# Patient Record
Sex: FEMALE | Race: BLACK OR AFRICAN AMERICAN | NOT HISPANIC OR LATINO | Employment: FULL TIME | URBAN - METROPOLITAN AREA
[De-identification: names, ages, dates, MRNs, and addresses within clinical notes are randomized per-mention and may not be internally consistent; named-entity substitution may affect disease eponyms.]

---

## 2024-09-17 ENCOUNTER — HOSPITAL ENCOUNTER (OUTPATIENT)
Facility: HOSPITAL | Age: 34
Discharge: HOME | End: 2024-09-18
Attending: EMERGENCY MEDICINE | Admitting: FAMILY MEDICINE
Payer: OTHER GOVERNMENT

## 2024-09-17 DIAGNOSIS — R29.818 ACUTE FOCAL NEUROLOGICAL DEFICIT: ICD-10-CM

## 2024-09-17 DIAGNOSIS — G43.919 INTRACTABLE MIGRAINE WITHOUT STATUS MIGRAINOSUS, UNSPECIFIED MIGRAINE TYPE: ICD-10-CM

## 2024-09-17 DIAGNOSIS — R27.0 ATAXIA: Primary | ICD-10-CM

## 2024-09-17 DIAGNOSIS — R29.810 FACIAL DROOP: ICD-10-CM

## 2024-09-17 PROBLEM — F41.1 GAD (GENERALIZED ANXIETY DISORDER): Status: ACTIVE | Noted: 2024-09-17

## 2024-09-17 PROBLEM — G47.00 INSOMNIA: Status: ACTIVE | Noted: 2024-09-17

## 2024-09-17 PROBLEM — R51.9 HEADACHE: Status: ACTIVE | Noted: 2024-09-17

## 2024-09-17 PROBLEM — R53.1 LEFT-SIDED WEAKNESS: Status: ACTIVE | Noted: 2024-09-17

## 2024-09-17 LAB
ALBUMIN SERPL BCP-MCNC: 4.2 G/DL (ref 3.5–5.2)
ALP SERPL-CCNC: 55 U/L (ref 55–135)
ALT SERPL W/O P-5'-P-CCNC: 12 U/L (ref 10–44)
AMPHET+METHAMPHET UR QL: NEGATIVE
ANION GAP SERPL CALC-SCNC: 8 MMOL/L (ref 8–16)
AST SERPL-CCNC: 14 U/L (ref 10–40)
BARBITURATES UR QL SCN>200 NG/ML: NEGATIVE
BASOPHILS # BLD AUTO: 0.02 K/UL (ref 0–0.2)
BASOPHILS NFR BLD: 0.6 % (ref 0–1.9)
BENZODIAZ UR QL SCN>200 NG/ML: NEGATIVE
BILIRUB SERPL-MCNC: 0.9 MG/DL (ref 0.1–1)
BUN SERPL-MCNC: 13 MG/DL (ref 6–20)
BZE UR QL SCN: NEGATIVE
CALCIUM SERPL-MCNC: 9.2 MG/DL (ref 8.7–10.5)
CANNABINOIDS UR QL SCN: NEGATIVE
CHLORIDE SERPL-SCNC: 108 MMOL/L (ref 95–110)
CHOLEST SERPL-MCNC: 120 MG/DL (ref 120–199)
CHOLEST/HDLC SERPL: 3.2 {RATIO} (ref 2–5)
CO2 SERPL-SCNC: 23 MMOL/L (ref 23–29)
CREAT SERPL-MCNC: 0.8 MG/DL (ref 0.5–1.4)
CREAT UR-MCNC: 51.4 MG/DL (ref 15–325)
DIFFERENTIAL METHOD BLD: ABNORMAL
EOSINOPHIL # BLD AUTO: 0 K/UL (ref 0–0.5)
EOSINOPHIL NFR BLD: 0.9 % (ref 0–8)
ERYTHROCYTE [DISTWIDTH] IN BLOOD BY AUTOMATED COUNT: 12 % (ref 11.5–14.5)
EST. GFR  (NO RACE VARIABLE): >60 ML/MIN/1.73 M^2
GLUCOSE SERPL-MCNC: 104 MG/DL (ref 70–110)
HCT VFR BLD AUTO: 42.4 % (ref 37–48.5)
HDLC SERPL-MCNC: 38 MG/DL (ref 40–75)
HDLC SERPL: 31.7 % (ref 20–50)
HGB BLD-MCNC: 13.9 G/DL (ref 12–16)
IMM GRANULOCYTES # BLD AUTO: 0.01 K/UL (ref 0–0.04)
IMM GRANULOCYTES NFR BLD AUTO: 0.3 % (ref 0–0.5)
INR PPP: 1 (ref 0.8–1.2)
LDLC SERPL CALC-MCNC: 72 MG/DL (ref 63–159)
LYMPHOCYTES # BLD AUTO: 1.1 K/UL (ref 1–4.8)
LYMPHOCYTES NFR BLD: 33.4 % (ref 18–48)
MCH RBC QN AUTO: 28.4 PG (ref 27–31)
MCHC RBC AUTO-ENTMCNC: 32.8 G/DL (ref 32–36)
MCV RBC AUTO: 87 FL (ref 82–98)
METHADONE UR QL SCN>300 NG/ML: NEGATIVE
MONOCYTES # BLD AUTO: 0.3 K/UL (ref 0.3–1)
MONOCYTES NFR BLD: 8.2 % (ref 4–15)
NEUTROPHILS # BLD AUTO: 1.9 K/UL (ref 1.8–7.7)
NEUTROPHILS NFR BLD: 56.6 % (ref 38–73)
NONHDLC SERPL-MCNC: 82 MG/DL
NRBC BLD-RTO: 0 /100 WBC
OPIATES UR QL SCN: NEGATIVE
PCP UR QL SCN>25 NG/ML: NEGATIVE
PLATELET # BLD AUTO: 269 K/UL (ref 150–450)
PMV BLD AUTO: 9.4 FL (ref 9.2–12.9)
POCT GLUCOSE: 107 MG/DL (ref 70–110)
POTASSIUM SERPL-SCNC: 3.9 MMOL/L (ref 3.5–5.1)
PROT SERPL-MCNC: 7.8 G/DL (ref 6–8.4)
PROTHROMBIN TIME: 11.2 SEC (ref 9–12.5)
RBC # BLD AUTO: 4.9 M/UL (ref 4–5.4)
SODIUM SERPL-SCNC: 139 MMOL/L (ref 136–145)
TOXICOLOGY INFORMATION: NORMAL
TRIGL SERPL-MCNC: 50 MG/DL (ref 30–150)
TSH SERPL DL<=0.005 MIU/L-ACNC: 0.93 UIU/ML (ref 0.4–4)
WBC # BLD AUTO: 3.29 K/UL (ref 3.9–12.7)

## 2024-09-17 PROCEDURE — G0378 HOSPITAL OBSERVATION PER HR: HCPCS

## 2024-09-17 PROCEDURE — 25000003 PHARM REV CODE 250: Performed by: EMERGENCY MEDICINE

## 2024-09-17 PROCEDURE — 97166 OT EVAL MOD COMPLEX 45 MIN: CPT

## 2024-09-17 PROCEDURE — 94761 N-INVAS EAR/PLS OXIMETRY MLT: CPT

## 2024-09-17 PROCEDURE — G0425 INPT/ED TELECONSULT30: HCPCS | Mod: GT,,, | Performed by: STUDENT IN AN ORGANIZED HEALTH CARE EDUCATION/TRAINING PROGRAM

## 2024-09-17 PROCEDURE — 80053 COMPREHEN METABOLIC PANEL: CPT | Performed by: EMERGENCY MEDICINE

## 2024-09-17 PROCEDURE — 97530 THERAPEUTIC ACTIVITIES: CPT

## 2024-09-17 PROCEDURE — 97162 PT EVAL MOD COMPLEX 30 MIN: CPT

## 2024-09-17 PROCEDURE — 25000003 PHARM REV CODE 250: Performed by: FAMILY MEDICINE

## 2024-09-17 PROCEDURE — 80061 LIPID PANEL: CPT | Performed by: EMERGENCY MEDICINE

## 2024-09-17 PROCEDURE — 85610 PROTHROMBIN TIME: CPT | Performed by: EMERGENCY MEDICINE

## 2024-09-17 PROCEDURE — 80307 DRUG TEST PRSMV CHEM ANLYZR: CPT | Performed by: FAMILY MEDICINE

## 2024-09-17 PROCEDURE — 93005 ELECTROCARDIOGRAM TRACING: CPT

## 2024-09-17 PROCEDURE — 82962 GLUCOSE BLOOD TEST: CPT

## 2024-09-17 PROCEDURE — 25500020 PHARM REV CODE 255: Performed by: EMERGENCY MEDICINE

## 2024-09-17 PROCEDURE — 63600175 PHARM REV CODE 636 W HCPCS: Performed by: FAMILY MEDICINE

## 2024-09-17 PROCEDURE — 93010 ELECTROCARDIOGRAM REPORT: CPT | Mod: ,,, | Performed by: INTERNAL MEDICINE

## 2024-09-17 PROCEDURE — 96365 THER/PROPH/DIAG IV INF INIT: CPT

## 2024-09-17 PROCEDURE — 99285 EMERGENCY DEPT VISIT HI MDM: CPT | Mod: 25

## 2024-09-17 PROCEDURE — 84443 ASSAY THYROID STIM HORMONE: CPT | Performed by: EMERGENCY MEDICINE

## 2024-09-17 PROCEDURE — 96372 THER/PROPH/DIAG INJ SC/IM: CPT | Performed by: FAMILY MEDICINE

## 2024-09-17 PROCEDURE — 85025 COMPLETE CBC W/AUTO DIFF WBC: CPT | Performed by: EMERGENCY MEDICINE

## 2024-09-17 PROCEDURE — 96374 THER/PROPH/DIAG INJ IV PUSH: CPT | Mod: 59

## 2024-09-17 PROCEDURE — 97116 GAIT TRAINING THERAPY: CPT

## 2024-09-17 RX ORDER — TRAZODONE HYDROCHLORIDE 50 MG/1
50 TABLET ORAL NIGHTLY
COMMUNITY
Start: 2023-12-14

## 2024-09-17 RX ORDER — ESCITALOPRAM OXALATE 20 MG/1
20 TABLET ORAL
Status: ON HOLD | COMMUNITY
End: 2024-09-18

## 2024-09-17 RX ORDER — ONDANSETRON 4 MG/1
4 TABLET, ORALLY DISINTEGRATING ORAL EVERY 6 HOURS PRN
Status: DISCONTINUED | OUTPATIENT
Start: 2024-09-17 | End: 2024-09-18 | Stop reason: HOSPADM

## 2024-09-17 RX ORDER — KETOROLAC TROMETHAMINE 30 MG/ML
30 INJECTION, SOLUTION INTRAMUSCULAR; INTRAVENOUS EVERY 6 HOURS PRN
Status: DISCONTINUED | OUTPATIENT
Start: 2024-09-17 | End: 2024-09-18 | Stop reason: HOSPADM

## 2024-09-17 RX ORDER — ESCITALOPRAM OXALATE 10 MG/1
20 TABLET ORAL DAILY
Status: DISCONTINUED | OUTPATIENT
Start: 2024-09-17 | End: 2024-09-17

## 2024-09-17 RX ORDER — HYDROXYZINE HYDROCHLORIDE 25 MG/1
50 TABLET, FILM COATED ORAL
COMMUNITY
Start: 2023-12-14

## 2024-09-17 RX ORDER — LABETALOL HYDROCHLORIDE 5 MG/ML
10 INJECTION, SOLUTION INTRAVENOUS EVERY 4 HOURS PRN
Status: DISCONTINUED | OUTPATIENT
Start: 2024-09-17 | End: 2024-09-18 | Stop reason: HOSPADM

## 2024-09-17 RX ORDER — ENOXAPARIN SODIUM 100 MG/ML
40 INJECTION SUBCUTANEOUS EVERY 24 HOURS
Status: DISCONTINUED | OUTPATIENT
Start: 2024-09-17 | End: 2024-09-18 | Stop reason: HOSPADM

## 2024-09-17 RX ORDER — MECLIZINE HYDROCHLORIDE 25 MG/1
25 TABLET ORAL
Status: COMPLETED | OUTPATIENT
Start: 2024-09-17 | End: 2024-09-17

## 2024-09-17 RX ORDER — GABAPENTIN 100 MG/1
1 CAPSULE ORAL EVERY MORNING
COMMUNITY

## 2024-09-17 RX ORDER — BENZOYL PEROXIDE 100 MG/ML
LIQUID TOPICAL
COMMUNITY
Start: 2024-06-25

## 2024-09-17 RX ORDER — ACETAMINOPHEN 325 MG/1
650 TABLET ORAL EVERY 6 HOURS PRN
Status: DISCONTINUED | OUTPATIENT
Start: 2024-09-17 | End: 2024-09-18 | Stop reason: HOSPADM

## 2024-09-17 RX ORDER — MEDROXYPROGESTERONE ACETATE 10 MG/1
10 TABLET ORAL DAILY
Status: ON HOLD | COMMUNITY
Start: 2024-09-16 | End: 2024-09-18 | Stop reason: HOSPADM

## 2024-09-17 RX ORDER — CLINDAMYCIN PHOSPHATE 10 MG/G
GEL TOPICAL
COMMUNITY
Start: 2024-06-25

## 2024-09-17 RX ORDER — IBUPROFEN 800 MG/1
800 TABLET ORAL
COMMUNITY
Start: 2024-07-12

## 2024-09-17 RX ORDER — SODIUM CHLORIDE 0.9 % (FLUSH) 0.9 %
10 SYRINGE (ML) INJECTION
Status: DISCONTINUED | OUTPATIENT
Start: 2024-09-17 | End: 2024-09-18 | Stop reason: HOSPADM

## 2024-09-17 RX ORDER — KETOROLAC TROMETHAMINE 30 MG/ML
30 INJECTION, SOLUTION INTRAMUSCULAR; INTRAVENOUS ONCE
Status: COMPLETED | OUTPATIENT
Start: 2024-09-17 | End: 2024-09-17

## 2024-09-17 RX ORDER — METRONIDAZOLE 500 MG/1
1 TABLET ORAL 2 TIMES DAILY
Status: ON HOLD | COMMUNITY
Start: 2024-05-16 | End: 2024-09-18 | Stop reason: CLARIF

## 2024-09-17 RX ORDER — TRAZODONE HYDROCHLORIDE 50 MG/1
50 TABLET ORAL NIGHTLY
Status: DISCONTINUED | OUTPATIENT
Start: 2024-09-17 | End: 2024-09-18 | Stop reason: HOSPADM

## 2024-09-17 RX ADMIN — IOHEXOL 100 ML: 350 INJECTION, SOLUTION INTRAVENOUS at 09:09

## 2024-09-17 RX ADMIN — ENOXAPARIN SODIUM 40 MG: 40 INJECTION SUBCUTANEOUS at 05:09

## 2024-09-17 RX ADMIN — MECLIZINE HYDROCHLORIDE 25 MG: 25 TABLET ORAL at 10:09

## 2024-09-17 RX ADMIN — KETOROLAC TROMETHAMINE 30 MG: 30 INJECTION, SOLUTION INTRAMUSCULAR; INTRAVENOUS at 01:09

## 2024-09-17 RX ADMIN — TRAZODONE HYDROCHLORIDE 50 MG: 50 TABLET ORAL at 08:09

## 2024-09-17 RX ADMIN — ACETAMINOPHEN 650 MG: 325 TABLET ORAL at 06:09

## 2024-09-17 NOTE — ASSESSMENT & PLAN NOTE
CT head and CTA negative   Symptoms improving  Symptoms possibly related to complicated migraine  As patient had headache during time of symptoms  Will obtain MRI   Neuro checks  Pt/ot

## 2024-09-17 NOTE — PT/OT/SLP EVAL
"Occupational Therapy   Evaluation    Name: Lizzy Anders  MRN: [id number]  Admitting Diagnosis: Left-sided weakness  Recent Surgery: * No surgery found *      Recommendations:     Discharge Recommendations: Low Intensity Therapy  Discharge Equipment Recommendations:  none  Barriers to discharge:  None    Assessment:     Lizzy Anders is a 33 y.o. female with a medical diagnosis of Left-sided weakness.  She presents with the following performance deficits affecting function: weakness, impaired endurance, impaired self care skills, impaired functional mobility, gait instability, impaired balance, decreased coordination, decreased upper extremity function, decreased lower extremity function, pain, abnormal tone, impaired fine motor.      Rehab Prognosis: Good; patient would benefit from acute skilled OT services to address these deficits and reach maximum level of function.       Plan:     Patient to be seen 2 x/week to address the above listed problems via self-care/home management, therapeutic activities, therapeutic exercises, community/work re-entry  Plan of Care Expires: 10/01/24  Plan of Care Reviewed with: patient, spouse    Subjective     Chief Complaint: "How is this going to affect my daily tasks?"  Patient/Family Comments/goals: To return to previous level of function.     Occupational Profile:  Living Environment: Lives with spouse and children in single story home with no steps to enter. Tub shower configuration.   Previous level of function: Independent with ADLs, IADLs.  Roles and Routines: Drives, works for , mother of two (8 & 10 y/o)  Equipment Used at Home: none  Assistance upon Discharge: family    Pain/Comfort:  Pain Rating 1: 6/10  Location - Side 1: Right  Location 1: head  Pain Addressed 1: Nurse notified    Patients cultural, spiritual, Hinduism conflicts given the current situation: no    Objective:     Communicated with: nursing prior to session.  Patient found HOB " "elevated with peripheral IV upon OT entry to room.    General Precautions: Standard, fall  Orthopedic Precautions: N/A  Braces: N/A  Respiratory Status: Room air    Occupational Performance:    Bed Mobility:    Patient completed Scooting/Bridging with stand by assistance  Patient completed Supine to Sit with stand by assistance  Patient completed Sit to Supine with stand by assistance    Functional Mobility/Transfers:  Patient completed Sit <> Stand Transfer with contact guard assistance  with  no assistive device   Patient completed Bed <> Chair Transfer using Step Transfer technique with minimum assistance with hand-held assist  Functional Mobility: Patient ambulated with Min A handheld assistance in room and hallway. Gait instability, L LE weakness, and motor planning deficits limiting independence with functional turns and steps.      Activities of Daily Living:  Lower Body Dressing: contact guard assistance to don shoes while seated EOB    Cognitive/Visual Perceptual:  Cognitive/Psychosocial Skills:     -       Oriented to: Person, Place, Time, and Situation   -       Follows Commands/attention:Follows multistep  commands  -       Communication: clear/fluent  -       Safety awareness/insight to disability: intact   -       Mood/Affect/Coping skills/emotional control: Appropriate to situation, Cooperative, and Pleasant  Visual/Perceptual:      -Intact  tracking and saccades      Physical Exam:  Balance:    -       Sitting: WFL; Standing: Fair  Postural examination/scapula alignment:    -       No postural abnormalities identified  Sensation:    -       Impaired  numbness reported in lip on left side, and feeling of "weakness" generalized on left side  Motor Planning:    -       Delayed on left extremities  Upper Extremity Range of Motion:     -       Right Upper Extremity: WNL  -       Left Upper Extremity: WNL  Upper Extremity Strength:    -       Right Upper Extremity: WNL  -       Left Upper Extremity: " "4/5   Strength:    -       Right Upper Extremity: WNL  -       Left Upper Extremity: Fair  Fine Motor Coordination:    -       Impaired  Left hand, finger to nose delayed, Left hand thumb/finger opposition skills delayed, and Left hand, diadochokinesis skill delayed  Gross motor coordination:   Left sided hand-eye coordination    AMPAC 6 Click ADL:  AMPAC Total Score: 18    Treatment & Education:  Educated on role of OT, POC, motor learning exercises, and activity modifications. Pt discouraged from driving and return to work (due to heavy lifting and activity requirements) until approved by a doctor. Family educated on importance of "retraining" movements and utilizing L UE as normally as possibly. Answered all family's questions within scope of practice.     Patient left sitting edge of bed with all lines intact, call button in reach, and nursing and spouse present    GOALS:   Multidisciplinary Problems       Occupational Therapy Goals          Problem: Occupational Therapy    Goal Priority Disciplines Outcome Interventions   Occupational Therapy Goal     OT, PT/OT     Description: Goals to be met by: 10/1/24     Patient will increase functional independence with ADLs by performing:    LE Dressing with [place].  Grooming while standing at sink with [place].  Toilet transfer to toilet with [place].  Upper extremity exercise program x15 reps per handout, with independence.                         History:     History reviewed. No pertinent past medical history.    History reviewed. No pertinent surgical history.    Time Tracking:     OT Date of Treatment: 09/17/24  OT Start Time: 1255  OT Stop Time: 1322  OT Total Time (min): 27 min    Billable Minutes:Evaluation 15  Therapeutic Activity 12    9/17/2024  "

## 2024-09-17 NOTE — SUBJECTIVE & OBJECTIVE
HPI:  33 y.o. female w/ PMHx of migraines, Anxiety, who presents w/ HA, L facial pain, OS pain and L sided tingling.    called EMS as she was trebling, throwing up and her face was hurting .   States that her face was hurting and she noted the weakness when she woke up and then she developed L sided numbness.   States that the HA is her typical migrainous HA, although the sensory deficits are new.   Images personally reviewed and interpreted:  Study: Head CT  Study Interpretation: No acute intracranial abnormalities, specifically no early signs of ischemia and no intracranial hemorrhage.        Assessment and plan:  NIHSS 2 for RFD( present on activation, not at rest, able to activate face symmetrically when asked to puff out cheeks and subjective left sided numbness.   OOW for TNK due to most of the symptoms being present when she woke.   Low suspicion for LVO at this time based on reported deficits.     Lytics recommendation: Thrombolytic therapy not recommended due to Outside of treatment window  and Mild Non-Disabling Symptoms  Thrombectomy recommendation: No; at this time symptoms not suggestive of large vessel occlusion  Placement recommendation:  As per ED

## 2024-09-17 NOTE — H&P
[facility], later [facility] - Emergency Dept.  [facility] Medicine  History & Physical    Patient Name: Lizzy Anders  MRN: [id number]  Patient Class: OP- Observation  Admission Date: 9/17/2024  Attending Physician: Tejinder Henson MD  Primary Care Provider: No primary care provider on file.         Patient information was obtained from patient and ER records.     Subjective:     Principal Problem:Left-sided weakness    Chief Complaint:   Chief Complaint   Patient presents with    Facial Droop     With unsteady gait per EMS; patient's  noticed left sided facial droop at about 7 am this morning; EMS reports patient was able to walk to stretcher, but gait was unsteady        HPI: Patient is a 33 y.o. aa female with a PMH of FERNANDO who presents to the Emergency Department for a potential stroke evaluation. Patient reports left sided weakness/numbness, speech difficulty, and facial droop. She also reported headache and left eye pain. States having unsteady gate. Denies ever having similar symptoms on the past. Denies any chest pain, sob however does report nausea.     In the ED, code stroke called and tpa was not recommended. Head CT and CTA negative.          History reviewed. No pertinent past medical history.    History reviewed. No pertinent surgical history.    Review of patient's allergies indicates:   Allergen Reactions    Sumatriptan Hives       No current facility-administered medications on file prior to encounter.     Current Outpatient Medications on File Prior to Encounter   Medication Sig    benzoyl peroxide (BENZAC AC) 10 % external wash Apply topically.    clindamycin phosphate 1% (CLINDAGEL) 1 % gel Apply three times daily for seven to ten days for folliculitis.    EScitalopram oxalate (LEXAPRO) 20 MG tablet Take 20 mg by mouth.    gabapentin (NEURONTIN) 100 MG capsule Take 1 capsule by mouth every morning.    hydrOXYzine HCL (ATARAX) 25 MG tablet Take 50 mg by mouth.    ibuprofen (ADVIL,MOTRIN) 800 MG tablet Take 800 mg  by mouth.    medroxyPROGESTERone (PROVERA) 10 MG tablet Take 10 mg by mouth once daily.    metroNIDAZOLE (FLAGYL) 500 MG tablet Take 1 tablet by mouth 2 (two) times daily.    traZODone (DESYREL) 50 MG tablet Take 50 mg by mouth every evening.    levonorgestreL (MIRENA) 52 mg IUD 52 mg by Intrauterine route.     Family History    None       Tobacco Use    Smoking status: Unknown    Smokeless tobacco: Not on file   Substance and Sexual Activity    Alcohol use: Not on file    Drug use: Not on file    Sexual activity: Not on file     Review of Systems   Constitutional:  Negative for fatigue and fever.   HENT:  Negative for sinus pressure.    Eyes:  Negative for visual disturbance.   Respiratory:  Negative for shortness of breath.    Cardiovascular:  Negative for chest pain.   Gastrointestinal:  Negative for nausea and vomiting.   Genitourinary:  Negative for difficulty urinating.   Musculoskeletal:  Negative for back pain.   Skin:  Negative for rash.   Neurological:  Positive for facial asymmetry, speech difficulty, weakness and numbness. Negative for headaches.   Psychiatric/Behavioral:  Negative for confusion.      Objective:     Vital Signs (Most Recent):  Temp: 99.5 °F (37.5 °C) (09/17/24 0806)  Pulse: (!) 58 (09/17/24 1036)  Resp: 16 (09/17/24 1036)  BP: 112/60 (09/17/24 1036)  SpO2: 98 % (09/17/24 1036) Vital Signs (24h Range):  Temp:  [99.5 °F (37.5 °C)] 99.5 °F (37.5 °C)  Pulse:  [58-85] 58  Resp:  [16-20] 16  SpO2:  [98 %-99 %] 98 %  BP: (112-122)/(60-74) 112/60     Weight: 82.9 kg (182 lb 12.2 oz)  Body mass index is 30.41 kg/m².     Physical Exam  Constitutional:       General: She is not in acute distress.     Appearance: She is well-developed. She is not diaphoretic.   HENT:      Head: Normocephalic and atraumatic.   Eyes:      Pupils: Pupils are equal, round, and reactive to light.   Cardiovascular:      Rate and Rhythm: Normal rate and regular rhythm.      Heart sounds: Normal heart sounds. No murmur  heard.     No friction rub. No gallop.   Pulmonary:      Effort: Pulmonary effort is normal. No respiratory distress.      Breath sounds: Normal breath sounds. No stridor. No wheezing or rales.   Abdominal:      General: Bowel sounds are normal. There is no distension.      Palpations: Abdomen is soft. There is no mass.      Tenderness: There is no abdominal tenderness. There is no guarding.   Musculoskeletal:      Right lower leg: No edema.      Left lower leg: No edema.   Skin:     General: Skin is warm.      Findings: No erythema.   Neurological:      Mental Status: She is alert and oriented to person, place, and time.      Motor: Weakness (LLE weakness) present.              CRANIAL NERVES     CN III, IV, VI   Pupils are equal, round, and reactive to light.       Significant Labs:   Results for orders placed or performed during the hospital encounter of 09/17/24   CBC W/ AUTO DIFFERENTIAL   Result Value Ref Range    WBC 3.29 (L) 3.90 - 12.70 K/uL    RBC 4.90 4.00 - 5.40 M/uL    Hemoglobin 13.9 12.0 - 16.0 g/dL    Hematocrit 42.4 37.0 - 48.5 %    MCV 87 82 - 98 fL    MCH 28.4 27.0 - 31.0 pg    MCHC 32.8 32.0 - 36.0 g/dL    RDW 12.0 11.5 - 14.5 %    Platelets 269 150 - 450 K/uL    MPV 9.4 9.2 - 12.9 fL    Immature Granulocytes 0.3 0.0 - 0.5 %    Gran # (ANC) 1.9 1.8 - 7.7 K/uL    Immature Grans (Abs) 0.01 0.00 - 0.04 K/uL    Lymph # 1.1 1.0 - 4.8 K/uL    Mono # 0.3 0.3 - 1.0 K/uL    Eos # 0.0 0.0 - 0.5 K/uL    Baso # 0.02 0.00 - 0.20 K/uL    nRBC 0 0 /100 WBC    Gran % 56.6 38.0 - 73.0 %    Lymph % 33.4 18.0 - 48.0 %    Mono % 8.2 4.0 - 15.0 %    Eosinophil % 0.9 0.0 - 8.0 %    Basophil % 0.6 0.0 - 1.9 %    Differential Method Automated    Comprehensive metabolic panel   Result Value Ref Range    Sodium 139 136 - 145 mmol/L    Potassium 3.9 3.5 - 5.1 mmol/L    Chloride 108 95 - 110 mmol/L    CO2 23 23 - 29 mmol/L    Glucose 104 70 - 110 mg/dL    BUN 13 6 - 20 mg/dL    Creatinine 0.8 0.5 - 1.4 mg/dL    Calcium 9.2  8.7 - 10.5 mg/dL    Total Protein 7.8 6.0 - 8.4 g/dL    Albumin 4.2 3.5 - 5.2 g/dL    Total Bilirubin 0.9 0.1 - 1.0 mg/dL    Alkaline Phosphatase 55 55 - 135 U/L    AST 14 10 - 40 U/L    ALT 12 10 - 44 U/L    eGFR >60 >60 mL/min/1.73 m^2    Anion Gap 8 8 - 16 mmol/L   Protime-INR   Result Value Ref Range    Prothrombin Time 11.2 9.0 - 12.5 sec    INR 1.0 0.8 - 1.2   TSH   Result Value Ref Range    TSH 0.931 0.400 - 4.000 uIU/mL   Drug screen panel, in-house   Result Value Ref Range    Benzodiazepines Negative Negative    Methadone metabolites Negative Negative    Cocaine (Metab.) Negative Negative    Opiate Scrn, Ur Negative Negative    Barbiturate Screen, Ur Negative Negative    Amphetamine Screen, Ur Negative Negative    THC Negative Negative    Phencyclidine Negative Negative    Creatinine, Urine 51.4 15.0 - 325.0 mg/dL    Toxicology Information SEE COMMENT    ECG 12 lead   Result Value Ref Range    QRS Duration 78 ms    OHS QTC Calculation 460 ms   POCT glucose   Result Value Ref Range    POCT Glucose 107 70 - 110 mg/dL         Significant Imaging: I have reviewed all pertinent imaging results/findings within the past 24 hours.  Assessment/Plan:     * Left-sided weakness  CT head and CTA negative   Symptoms improving  Symptoms possibly related to complicated migraine  As patient had headache during time of symptoms  Will obtain MRI   Neuro checks  Pt/ot       Headache  Toradol given  Cont to monitor response      Insomnia  Cont trazodone       FERNANDO (generalized anxiety disorder)  Cont lexapro         VTE Risk Mitigation (From admission, onward)           Ordered     enoxaparin injection 40 mg  Daily         09/17/24 1147     Reason for no Mechanical VTE Prophylaxis  Once        Comments: lovenox   Question:  Reasons:  Answer:  Physician Provided (leave comment)    09/17/24 1147     IP VTE HIGH RISK PATIENT  Once         09/17/24 1147                       On 09/17/2024, patient should be placed in hospital  observation services under my care.             Tejinder Henson MD  Department of Hospital Medicine  [facility] - Emergency Dept.

## 2024-09-17 NOTE — PT/OT/SLP EVAL
"Physical Therapy Evaluation    Patient Name:  Lizzy Anders   MRN:  [id number]    Recommendations:     Discharge Recommendations: Low Intensity Therapy   Discharge Equipment Recommendations: walker, rolling, shower chair   Barriers to discharge: None    Assessment:     Lizzy Anders is a 33 y.o. female admitted with a medical diagnosis of Left-sided weakness.  She presents with the following impairments/functional limitations: weakness, impaired endurance, impaired functional mobility, gait instability, impaired balance, decreased safety awareness, pain, decreased lower extremity function which limits mobility and increases risk of falls. Increased difficulty walking with reports of "heaviness" to LLE particularly her hip. Increased muscle tightness and fatigue with increased distances. Pt reporting 4-5 migraines per week as well as increased dizziness. Pt will benefit from continued PT services at low intensity in order to progress toward baseline. Recommend low intensity with focus on community mobility.    Rehab Prognosis: Good; patient would benefit from acute skilled PT services to address these deficits and reach maximum level of function.    Recent Surgery: * No surgery found *      Plan:     During this hospitalization, patient to be seen 3 x/week to address the identified rehab impairments via gait training, therapeutic activities, therapeutic exercises, neuromuscular re-education and progress toward the following goals:    Plan of Care Expires:  10/01/24    Subjective     Chief Complaint: HA and L eye pain  Patient/Family Comments/goals: to get better/go home   Pain/Comfort:  Pain Rating 1: 6/10 (HA and L eye pain)  Pain Addressed 1: Reposition, Distraction, Nurse notified  Pain Rating Post-Intervention 1: 6/10    Patients cultural, spiritual, Shinto conflicts given the current situation: no    Living Environment:  Pt lives with spouse and kids (10-9 yo) in [facility] with no steps at " entry  Prior to admission, patients level of function was independent with ADLs, ambulatory in home and community. Pt works and drives.  Equipment used at home: none.  DME owned (not currently used): none.  Upon discharge, patient will have assistance from spouse.    Objective:     Communicated with nursing (Dory) and performed chart review via epic system prior to session.  Patient found supine with peripheral IV, telemetry  upon PT entry to room. Spouse at bedside    General Precautions: Standard, fall  Orthopedic Precautions:N/A   Braces: N/A  Respiratory Status: Room air    Exams:  Cognitive Exam:  Patient is oriented to Person, Place, Time, and Situation  Gross Motor Coordination:  impaired  Postural Exam:  Patient presented with the following abnormalities:    -       Rounded shoulders  -       Forward head  Sensation:    -       Impaired  impaired to LLE and around left side of mouth  RLE ROM: WFL  RLE Strength: WFL  LLE ROM: WFL except at L ankle and hip ~25-50% of normal. Movement at all joints very effortful, increased time for muscle recruitment  LLE Strength: 4/5    Functional Mobility:  Bed Mobility:     Scooting: supervision  Supine to Sit: supervision  Sit to Supine: supervision  Transfers:     Sit to Stand:  stand by assistance with no AD  Bed to Chair: stand by assistance with  no AD  using  Stand Pivot  Gait: 50 ft x 2 CGA/min A with HHA, demonstrates slow pace, flexed posture, decreased stance time and step length to LLE. Increased effort for L swing phase of gait due to tightness and heaviness at L hip with increased gait activity  Balance: fair dynamic standing balance      AM-PAC 6 CLICK MOBILITY  Total Score:16       Treatment & Education:  Educated pt on benefits of consistent participation in PT services to meet functional goals. Educated pt on seated therex to promote strength, circulation and joint mobility. Exercises included AP, LAQ, marching. Educated to perform exercises  intermittently throughout day to tolerance. Educated pt on importance of sitting OOB to promote endurance and overall activity tolerance. Educated pt on call don't fall policy and use of call button to alert nursing staff of needs (including to assist with returning back to bed). Pt expressed understanding.     Patient left sitting edge of bed with all lines intact, call button in reach, nursing notified, and spouse present.    GOALS:   Multidisciplinary Problems       Physical Therapy Goals          Problem: Physical Therapy    Goal Priority Disciplines Outcome Goal Variances Interventions   Physical Therapy Goal     PT, PT/OT      Description: Pt will perform bed mobility independently in order to participate in EOB activity.  Pt will perform transfers independently in order to participate in OOB activity.  Pt will ambulate 250 ft mod I with LRAD in order to participate in daily tasks.   Pt will tolerate sitting OOB x 2-4 hrs in order to participate in daily tasks.                        History:     History reviewed. No pertinent past medical history.    History reviewed. No pertinent surgical history.    Time Tracking:     PT Received On: 09/17/24  PT Start Time: 1255     PT Stop Time: 1320  PT Total Time (min): 25 min     Billable Minutes: Evaluation 10 and Gait Training 15      09/17/2024

## 2024-09-17 NOTE — SUBJECTIVE & OBJECTIVE
History reviewed. No pertinent past medical history.    History reviewed. No pertinent surgical history.    Review of patient's allergies indicates:   Allergen Reactions    Sumatriptan Hives       No current facility-administered medications on file prior to encounter.     Current Outpatient Medications on File Prior to Encounter   Medication Sig    benzoyl peroxide (BENZAC AC) 10 % external wash Apply topically.    clindamycin phosphate 1% (CLINDAGEL) 1 % gel Apply three times daily for seven to ten days for folliculitis.    EScitalopram oxalate (LEXAPRO) 20 MG tablet Take 20 mg by mouth.    gabapentin (NEURONTIN) 100 MG capsule Take 1 capsule by mouth every morning.    hydrOXYzine HCL (ATARAX) 25 MG tablet Take 50 mg by mouth.    ibuprofen (ADVIL,MOTRIN) 800 MG tablet Take 800 mg by mouth.    medroxyPROGESTERone (PROVERA) 10 MG tablet Take 10 mg by mouth once daily.    metroNIDAZOLE (FLAGYL) 500 MG tablet Take 1 tablet by mouth 2 (two) times daily.    traZODone (DESYREL) 50 MG tablet Take 50 mg by mouth every evening.    levonorgestreL (MIRENA) 52 mg IUD 52 mg by Intrauterine route.     Family History    None       Tobacco Use    Smoking status: Unknown    Smokeless tobacco: Not on file   Substance and Sexual Activity    Alcohol use: Not on file    Drug use: Not on file    Sexual activity: Not on file     Review of Systems   Constitutional:  Negative for fatigue and fever.   HENT:  Negative for sinus pressure.    Eyes:  Negative for visual disturbance.   Respiratory:  Negative for shortness of breath.    Cardiovascular:  Negative for chest pain.   Gastrointestinal:  Negative for nausea and vomiting.   Genitourinary:  Negative for difficulty urinating.   Musculoskeletal:  Negative for back pain.   Skin:  Negative for rash.   Neurological:  Positive for facial asymmetry, speech difficulty, weakness and numbness. Negative for headaches.   Psychiatric/Behavioral:  Negative for confusion.      Objective:     Vital  Signs (Most Recent):  Temp: 99.5 °F (37.5 °C) (09/17/24 0806)  Pulse: (!) 58 (09/17/24 1036)  Resp: 16 (09/17/24 1036)  BP: 112/60 (09/17/24 1036)  SpO2: 98 % (09/17/24 1036) Vital Signs (24h Range):  Temp:  [99.5 °F (37.5 °C)] 99.5 °F (37.5 °C)  Pulse:  [58-85] 58  Resp:  [16-20] 16  SpO2:  [98 %-99 %] 98 %  BP: (112-122)/(60-74) 112/60     Weight: 82.9 kg (182 lb 12.2 oz)  Body mass index is 30.41 kg/m².     Physical Exam  Constitutional:       General: She is not in acute distress.     Appearance: She is well-developed. She is not diaphoretic.   HENT:      Head: Normocephalic and atraumatic.   Eyes:      Pupils: Pupils are equal, round, and reactive to light.   Cardiovascular:      Rate and Rhythm: Normal rate and regular rhythm.      Heart sounds: Normal heart sounds. No murmur heard.     No friction rub. No gallop.   Pulmonary:      Effort: Pulmonary effort is normal. No respiratory distress.      Breath sounds: Normal breath sounds. No stridor. No wheezing or rales.   Abdominal:      General: Bowel sounds are normal. There is no distension.      Palpations: Abdomen is soft. There is no mass.      Tenderness: There is no abdominal tenderness. There is no guarding.   Musculoskeletal:      Right lower leg: No edema.      Left lower leg: No edema.   Skin:     General: Skin is warm.      Findings: No erythema.   Neurological:      Mental Status: She is alert and oriented to person, place, and time.      Motor: Weakness (LLE weakness) present.              CRANIAL NERVES     CN III, IV, VI   Pupils are equal, round, and reactive to light.       Significant Labs:   Results for orders placed or performed during the hospital encounter of 09/17/24   CBC W/ AUTO DIFFERENTIAL   Result Value Ref Range    WBC 3.29 (L) 3.90 - 12.70 K/uL    RBC 4.90 4.00 - 5.40 M/uL    Hemoglobin 13.9 12.0 - 16.0 g/dL    Hematocrit 42.4 37.0 - 48.5 %    MCV 87 82 - 98 fL    MCH 28.4 27.0 - 31.0 pg    MCHC 32.8 32.0 - 36.0 g/dL    RDW 12.0  11.5 - 14.5 %    Platelets 269 150 - 450 K/uL    MPV 9.4 9.2 - 12.9 fL    Immature Granulocytes 0.3 0.0 - 0.5 %    Gran # (ANC) 1.9 1.8 - 7.7 K/uL    Immature Grans (Abs) 0.01 0.00 - 0.04 K/uL    Lymph # 1.1 1.0 - 4.8 K/uL    Mono # 0.3 0.3 - 1.0 K/uL    Eos # 0.0 0.0 - 0.5 K/uL    Baso # 0.02 0.00 - 0.20 K/uL    nRBC 0 0 /100 WBC    Gran % 56.6 38.0 - 73.0 %    Lymph % 33.4 18.0 - 48.0 %    Mono % 8.2 4.0 - 15.0 %    Eosinophil % 0.9 0.0 - 8.0 %    Basophil % 0.6 0.0 - 1.9 %    Differential Method Automated    Comprehensive metabolic panel   Result Value Ref Range    Sodium 139 136 - 145 mmol/L    Potassium 3.9 3.5 - 5.1 mmol/L    Chloride 108 95 - 110 mmol/L    CO2 23 23 - 29 mmol/L    Glucose 104 70 - 110 mg/dL    BUN 13 6 - 20 mg/dL    Creatinine 0.8 0.5 - 1.4 mg/dL    Calcium 9.2 8.7 - 10.5 mg/dL    Total Protein 7.8 6.0 - 8.4 g/dL    Albumin 4.2 3.5 - 5.2 g/dL    Total Bilirubin 0.9 0.1 - 1.0 mg/dL    Alkaline Phosphatase 55 55 - 135 U/L    AST 14 10 - 40 U/L    ALT 12 10 - 44 U/L    eGFR >60 >60 mL/min/1.73 m^2    Anion Gap 8 8 - 16 mmol/L   Protime-INR   Result Value Ref Range    Prothrombin Time 11.2 9.0 - 12.5 sec    INR 1.0 0.8 - 1.2   TSH   Result Value Ref Range    TSH 0.931 0.400 - 4.000 uIU/mL   Drug screen panel, in-house   Result Value Ref Range    Benzodiazepines Negative Negative    Methadone metabolites Negative Negative    Cocaine (Metab.) Negative Negative    Opiate Scrn, Ur Negative Negative    Barbiturate Screen, Ur Negative Negative    Amphetamine Screen, Ur Negative Negative    THC Negative Negative    Phencyclidine Negative Negative    Creatinine, Urine 51.4 15.0 - 325.0 mg/dL    Toxicology Information SEE COMMENT    ECG 12 lead   Result Value Ref Range    QRS Duration 78 ms    [facility] QTC Calculation 460 ms   POCT glucose   Result Value Ref Range    POCT Glucose 107 70 - 110 mg/dL         Significant Imaging: I have reviewed all pertinent imaging results/findings within the past 24 hours.

## 2024-09-17 NOTE — ED PROVIDER NOTES
SCRIBE #1 NOTE: I, Alec Kennedy, am scribing for, and in the presence of, Elfego May MD. I have scribed the entire note.       History     Chief Complaint   Patient presents with    Facial Droop     With unsteady gait per EMS; patient's  noticed left sided facial droop at about 7 am this morning; EMS reports patient was able to walk to stretcher, but gait was unsteady     Review of patient's allergies indicates:   Allergen Reactions    Sumatriptan Hives         History of Present Illness     HPI    9/17/2024, 8:09 AM  History obtained from the patient and EMS      History of Present Illness: Lizzy Anders is a 33 y.o. female patient who presents to the Emergency Department for a potential stroke evaluation. Pt's last known well was last night. Per EMS, pt's  noticed left sided facial droop this morning after waking up. Per EMS, pt was able to walk to the stretch but the gait was unsteady. Symptoms are constant and moderate in severity. No mitigating or exacerbating factors reported. Associated sxs include tremors and vomiting. Patient denies any speech difficulty, dizziness, nausea, SOB, and all other sxs at this time. No prior Tx reported. No further complaints or concerns at this time.       Arrival mode: EMS    PCP: No primary care provider on file.        Past Medical History:  History reviewed. No pertinent past medical history.    Past Surgical History:  History reviewed. No pertinent surgical history.      Family History:  No family history on file.    Social History:  Social History     Tobacco Use    Smoking status: Unknown    Smokeless tobacco: Not on file   Substance and Sexual Activity    Alcohol use: Not on file    Drug use: Not on file    Sexual activity: Not on file        Review of Systems     Review of Systems   Constitutional:  Negative for chills and fever.   HENT:  Negative for sore throat.    Respiratory:  Negative for shortness of breath.    Cardiovascular:   "Negative for chest pain.   Gastrointestinal:  Positive for vomiting. Negative for diarrhea and nausea.   Genitourinary:  Negative for dysuria.   Musculoskeletal:  Negative for back pain.   Skin:  Negative for rash.   Neurological:  Positive for tremors, facial asymmetry (left sided) and weakness (LUE). Negative for dizziness and speech difficulty.   Hematological:  Does not bruise/bleed easily.   All other systems reviewed and are negative.     Physical Exam     Initial Vitals [09/17/24 0806]   BP Pulse Resp Temp SpO2   122/74 85 18 99.5 °F (37.5 °C) 98 %      MAP       --          Physical Exam  Nursing Notes and Vital Signs Reviewed.  Constitutional: Patient is in no acute distress. Well-developed and well-nourished.  Head: Atraumatic. Normocephalic.  Eyes: PERRL. EOM intact. Conjunctivae are not pale. No scleral icterus.  ENT: Mucous membranes are moist. Oropharynx is clear and symmetric.    Neck: Supple. Full ROM. No lymphadenopathy.  Cardiovascular: Regular rate. Regular rhythm. No murmurs, rubs, or gallops. Distal pulses are 2+ and symmetric.  Pulmonary/Chest: No respiratory distress. Clear to auscultation bilaterally. No wheezing or rales.  Abdominal: Soft and non-distended.  There is no tenderness.  No rebound, guarding, or rigidity. Good bowel sounds.  Genitourinary: No CVA tenderness  Musculoskeletal: Moves all extremities. 4/5 strength LUE. No edema. No calf tenderness.  Skin: Warm and dry.  Neurological:  Alert, awake, and appropriate.  Normal speech. Left sided facial droop.  Psychiatric: Normal affect. Good eye contact. Appropriate in content.     ED Course   Procedures  ED Vital Signs:  Vitals:    09/17/24 0806 09/17/24 0814 09/17/24 1036   BP: 122/74 122/74 112/60   Pulse: 85 75 (!) 58   Resp: 18 20 16   Temp: 99.5 °F (37.5 °C)     TempSrc: Oral     SpO2: 98% 99% 98%   Weight: 82.9 kg (182 lb 12.2 oz)     Height: 5' 5" (1.651 m)         Abnormal Lab Results:  Labs Reviewed   CBC W/ AUTO DIFFERENTIAL " - Abnormal       Result Value    WBC 3.29 (*)     RBC 4.90      Hemoglobin 13.9      Hematocrit 42.4      MCV 87      MCH 28.4      MCHC 32.8      RDW 12.0      Platelets 269      MPV 9.4      Immature Granulocytes 0.3      Gran # (ANC) 1.9      Immature Grans (Abs) 0.01      Lymph # 1.1      Mono # 0.3      Eos # 0.0      Baso # 0.02      nRBC 0      Gran % 56.6      Lymph % 33.4      Mono % 8.2      Eosinophil % 0.9      Basophil % 0.6      Differential Method Automated     COMPREHENSIVE METABOLIC PANEL    Sodium 139      Potassium 3.9      Chloride 108      CO2 23      Glucose 104      BUN 13      Creatinine 0.8      Calcium 9.2      Total Protein 7.8      Albumin 4.2      Total Bilirubin 0.9      Alkaline Phosphatase 55      AST 14      ALT 12      eGFR >60      Anion Gap 8     PROTIME-INR    Prothrombin Time 11.2      INR 1.0     TSH    TSH 0.931     DRUG SCREEN PANEL, URINE EMERGENCY    Benzodiazepines Negative      Methadone metabolites Negative      Cocaine (Metab.) Negative      Opiate Scrn, Ur Negative      Barbiturate Screen, Ur Negative      Amphetamine Screen, Ur Negative      THC Negative      Phencyclidine Negative      Creatinine, Urine 51.4      Toxicology Information SEE COMMENT      Narrative:     Specimen Source->Urine   LIPID PANEL   POCT GLUCOSE, HAND-HELD DEVICE   POCT GLUCOSE    POCT Glucose 107          All Lab Results:  Results for orders placed or performed during the hospital encounter of 09/17/24   CBC W/ AUTO DIFFERENTIAL   Result Value Ref Range    WBC 3.29 (L) 3.90 - 12.70 K/uL    RBC 4.90 4.00 - 5.40 M/uL    Hemoglobin 13.9 12.0 - 16.0 g/dL    Hematocrit 42.4 37.0 - 48.5 %    MCV 87 82 - 98 fL    MCH 28.4 27.0 - 31.0 pg    MCHC 32.8 32.0 - 36.0 g/dL    RDW 12.0 11.5 - 14.5 %    Platelets 269 150 - 450 K/uL    MPV 9.4 9.2 - 12.9 fL    Immature Granulocytes 0.3 0.0 - 0.5 %    Gran # (ANC) 1.9 1.8 - 7.7 K/uL    Immature Grans (Abs) 0.01 0.00 - 0.04 K/uL    Lymph # 1.1 1.0 - 4.8 K/uL     Mono # 0.3 0.3 - 1.0 K/uL    Eos # 0.0 0.0 - 0.5 K/uL    Baso # 0.02 0.00 - 0.20 K/uL    nRBC 0 0 /100 WBC    Gran % 56.6 38.0 - 73.0 %    Lymph % 33.4 18.0 - 48.0 %    Mono % 8.2 4.0 - 15.0 %    Eosinophil % 0.9 0.0 - 8.0 %    Basophil % 0.6 0.0 - 1.9 %    Differential Method Automated    Comprehensive metabolic panel   Result Value Ref Range    Sodium 139 136 - 145 mmol/L    Potassium 3.9 3.5 - 5.1 mmol/L    Chloride 108 95 - 110 mmol/L    CO2 23 23 - 29 mmol/L    Glucose 104 70 - 110 mg/dL    BUN 13 6 - 20 mg/dL    Creatinine 0.8 0.5 - 1.4 mg/dL    Calcium 9.2 8.7 - 10.5 mg/dL    Total Protein 7.8 6.0 - 8.4 g/dL    Albumin 4.2 3.5 - 5.2 g/dL    Total Bilirubin 0.9 0.1 - 1.0 mg/dL    Alkaline Phosphatase 55 55 - 135 U/L    AST 14 10 - 40 U/L    ALT 12 10 - 44 U/L    eGFR >60 >60 mL/min/1.73 m^2    Anion Gap 8 8 - 16 mmol/L   Protime-INR   Result Value Ref Range    Prothrombin Time 11.2 9.0 - 12.5 sec    INR 1.0 0.8 - 1.2   TSH   Result Value Ref Range    TSH 0.931 0.400 - 4.000 uIU/mL   Drug screen panel, in-house   Result Value Ref Range    Benzodiazepines Negative Negative    Methadone metabolites Negative Negative    Cocaine (Metab.) Negative Negative    Opiate Scrn, Ur Negative Negative    Barbiturate Screen, Ur Negative Negative    Amphetamine Screen, Ur Negative Negative    THC Negative Negative    Phencyclidine Negative Negative    Creatinine, Urine 51.4 15.0 - 325.0 mg/dL    Toxicology Information SEE COMMENT    ECG 12 lead   Result Value Ref Range    QRS Duration 78 ms    [facility]HS QTC Calculation 460 ms   POCT glucose   Result Value Ref Range    POCT Glucose 107 70 - 110 mg/dL         Imaging Results:  Imaging Results              CTA Head and Neck (xpd) (Final result)  Result time 09/17/24 09:47:56      Final result by Gerry Barnes MD (09/17/24 09:47:56)                   Impression:      No acute abnormality. No high-grade stenosis or major vessel occlusion.    Findings discussed with the emergency  room physician at the time of dictation.    All CT scans at this facility are performed  using dose modulation techniques as appropriate to performed exam including the following:  automated exposure control; adjustment of mA and/or kV according to the patients size (this includes techniques or standardized protocols for targeted exams where dose is matched to indication/reason for exam: i.e. extremities or head);  iterative reconstruction technique.      Electronically signed by: Gerry Barnes  Date:    09/17/2024  Time:    09:47               Narrative:    EXAMINATION:  CTA HEAD AND NECK (XPD)    CLINICAL HISTORY:  Stroke/TIA, determine embolic source;    TECHNIQUE:  CT angiogram was performed from the level of the rex to the top of the head following the IV administration of 100mL of Omnipaque 350.   Sagittal and coronal reconstructions and maximum intensity projection reconstructions were performed. Arterial stenosis percentages are based on NASCET measurement criteria.    COMPARISON:  CT head same date    FINDINGS:  Non-Vascular Structures of the Neck/Thoracic Inlet (limited evaluation): Normal.    Aorta: Normal 3 vessel arch.    Extracranial carotid circulation: Common carotid arteries appear intact throughout their course.  Carotid bifurcations appear unremarkable.  No significant stenosis or atherosclerosis.  No aneurysm or dissection.  More cranial ascending internal carotid arteries appear intact the skull base.    Extracranial vertebral circulation: No hemodynamically significant stenosis, aneurysmal dilatation, or dissection.  Vertebral arteries are co dominant in the extracranial portion.    Intracranial Arteries:    ICA terminus are intact bilaterally.  Right MCA appears intact.  No aneurysm, severe stenosis, or occlusion.  Left MCA appears intact.  No aneurysm, severe stenosis, or occlusion.  Bilateral JOSE appear intact.  No aneurysm, severe stenosis, or occlusion.    Intracranial vertebrobasilar  circulation appears unremarkable.  Left PCA appears intact.  No aneurysm, severe stenosis or occlusion.  Right PCA arises through fetal circulation and appears intact.  No aneurysm, severe stenosis    Venous structures (limited evaluation): Normal.                                       CT Head Without Contrast (Final result)  Result time 09/17/24 08:00:05      Final result by Steve De La Paz MD (09/17/24 08:00:05)                   Impression:      No acute intracranial abnormality.  ASPECT score of 10.    Findings relayed to Dr. May via epic secure chat at 07:59 on 09/17/2024.      Electronically signed by: Steve De La Paz  Date:    09/17/2024  Time:    08:00               Narrative:    EXAMINATION:  CT HEAD WITHOUT CONTRAST    CLINICAL HISTORY:  Neuro deficit, acute, stroke suspected;    TECHNIQUE:  Low dose axial images were obtained through the head.  Coronal and sagittal reformations were also performed. Contrast was not administered.    COMPARISON:  None.    FINDINGS:  Cerebral and ventricular volumes are within normal limits.  No evidence of hydrocephalus.    No evidence of acute territorial infarct, intracranial hemorrhage, or mass lesion.  No significant midline shift or mass effect.    Midline structures and posterior fossa structures appear unremarkable.  Basal cisterns are clear.  No significant arterial calcifications seen.    Calvarium is intact without acute or aggressive abnormality.  Visualized orbits and globes are within normal limits.  Visualized paranasal sinuses and mastoid air cells are clear.                                       The EKG was ordered, reviewed, and independently interpreted by the ED provider.  Interpretation time: 8:24  Rate: 69 BPM  Rhythm: Sinus rhythm with sinus arrhythmia with 1st degree A-V block.  Interpretation: Cannot rule out Inferior infarct, age undetermined. Cannot rule out Anterior infarct, age undetermined. No STEMI.           The Emergency Provider  reviewed the vital signs and test results, which are outlined above.     ED Discussion     10:40 AM: Discussed case with Zulema Byrne NP ([facility]). Zulema Byrne NP agrees with current care and management of pt and accepts admission.   Admitting Service: Hospital Medicine  Admitting Physician: Dr. Henson  Admit to: obs med tele    10:40 AM: Re-evaluated pt. I have discussed test results, shared treatment plan, and the need for admission with patient and family at bedside. Pt and family express understanding at this time and agree with all information. All questions answered. Pt and family have no further questions or concerns at this time. Pt is ready for admit.           Medical Decision Making  DDx: CVA, weakness    Amount and/or Complexity of Data Reviewed  Independent Historian: EMS  Labs: ordered. Decision-making details documented in ED Course.  Radiology: ordered. Decision-making details documented in ED Course.  ECG/medicine tests: ordered and independent interpretation performed. Decision-making details documented in ED Course.  Discussion of management or test interpretation with external provider(s): Found to have facial droop and left sided weakness with ataxia that was noted upon waking up this morning.  TNKASE not given due to wake up stroke, and outside of time window.  CT head and CTA negative.  Will consult HM for observation,.    Risk  Prescription drug management.  Decision regarding hospitalization.                ED Medication(s):  Medications   iohexoL (OMNIPAQUE 350) injection 100 mL (100 mLs Intravenous Given 9/17/24 0921)   meclizine tablet 25 mg (25 mg Oral Given 9/17/24 1032)       New Prescriptions    No medications on file               Scribe Attestation:   Scribe #1: I performed the above scribed service and the documentation accurately describes the services I performed. I attest to the accuracy of the note.     Attending:   Physician Attestation Statement for Scribe #1: I,  Elfego May MD, personally performed the services described in this documentation, as scribed by Alec Kennedy, in my presence, and it is both accurate and complete.           Clinical Impression       ICD-10-CM ICD-9-CM   1. Ataxia  R27.0 781.3   2. Acute focal neurological deficit  R29.818 781.99   3. Facial droop  R29.810 781.94       Disposition:   Disposition: Placed in Observation  Condition: Stable         Elfego May MD  09/17/24 1125

## 2024-09-17 NOTE — TELEMEDICINE CONSULT
[facility]  Vascular Neurology  Comprehensive Stroke Center  TeleVascular Neurology Acute Consultation Note        Consult Information  Consult to Telemedicine - Acute Stroke  Consult performed by: Mylene Peres MD  Consult ordered by: Navdeep May MD          Consulting Provider: NAVDEEP MAY   Current Providers  No providers found    Patient Location:  [facility] EMERGENCY DEPARTMENT Emergency Department    Spoke hospital nurse at bedside with patient assisting consultant.  Patient information was obtained from patient and past medical records.       Stroke Documentation  Acute Stroke Times   Last Known Normal Date: 09/16/24  Last Known Normal Time:  (Prior to going to bed)  Unknown Symptom Onset Date: Unknown Date  Unknown Symptom Onset Time: Unknown Time  Stroke Team Called Date: 09/17/24  Stroke Team Called Time: 0756  Stroke Team Arrival Date: 09/17/24  Stroke Team Arrival Time: 0800  CT Interpretation Time: 0802  Thrombolytic Therapy Recommended: No    NIH Scale:  1a. Level of Consciousness: 0-->Alert, keenly responsive  1b. LOC Questions: 0-->Answers both questions correctly  1c. LOC Commands: 0-->Performs both tasks correctly  2. Best Gaze: 0-->Normal  3. Visual: 0-->No visual loss  4. Facial Palsy: 1-->Minor paralysis (flattened nasolabial fold, asymmetry on smiling) (Effort dependent)  5a. Motor Arm, Left: 0-->No drift, limb holds 90 (or 45) degrees for full 10 secs  5b. Motor Arm, Right: 0-->No drift, limb holds 90 (or 45) degrees for full 10 secs  6a. Motor Leg, Left: 0-->No drift, leg holds 30 degree position for full 5 secs  6b. Motor Leg, Right: 0-->No drift, leg holds 30 degree position for full 5 secs  7. Limb Ataxia: 0-->Absent  8. Sensory: 1-->Mild-to-moderate sensory loss, patient feels pinprick is less sharp or is dull on the affected side, or there is a loss of superficial pain with pinprick, but patient is aware of being touched  9. Best Language: 0-->No  "aphasia, normal  10. Dysarthria: 0-->Normal  11. Extinction and Inattention (formerly Neglect): 0-->No abnormality  Total (NIH Stroke Scale): 2      Modified [place]: Score: 0  [place]y Coma Scale:     ABCD2 Score:    [id number] Score:    HAS -BLED Score:    ICH Score:    Hunt & Edwards Classification:      Blood pressure 122/74, pulse 75, temperature 99.5 °F (37.5 °C), temperature source Oral, resp. rate 20, height 5' 5" (1.651 m), weight 82.9 kg (182 lb 12.2 oz), SpO2 99%.      In my opinion, this was a: Tier 2; VAN Stroke Assessment: Negative     Medical Decision Making  HPI:  33 y.o. female w/ PMHx of migraines, Anxiety, who presents w/ HA, L facial pain, OS pain and L sided tingling.    called EMS as she was trebling, throwing up and her face was hurting .   States that her face was hurting and she noted the weakness when she woke up and then she developed L sided numbness.   States that the HA is her typical migrainous HA, although the sensory deficits are new.   Images personally reviewed and interpreted:  Study: Head CT  Study Interpretation: No acute intracranial abnormalities, specifically no early signs of ischemia and no intracranial hemorrhage.        Assessment and plan:  NIHSS 2 for RFD( present on activation, not at rest, able to activate face symmetrically when asked to puff out cheeks and subjective left sided numbness.   OOW for TNK due to most of the symptoms being present when she woke.   Low suspicion for LVO at this time based on reported deficits.     Lytics recommendation: Thrombolytic therapy not recommended due to Outside of treatment window  and Mild Non-Disabling Symptoms  Thrombectomy recommendation: No; at this time symptoms not suggestive of large vessel occlusion  Placement recommendation:  As per ED                 No past medical history on file.  No past surgical history on file.  No family history on file.    Diagnoses  No problems updated.    Mylene Peres, " MD      Emergent/Acute neurological consultation requested by spoke provider due to critical concerns for possible cerebrovascular event that could result in permanent loss of neurologic/bodily function, severe disability or death of this patient.  Immediate/timely evaluation by a highly prepared expert is paramount for optimal outcomes  High risk for neurological deterioration if not properly diagnosed  High risk for neurological deterioration if not treated promplty/as soon as possible  Complex diagnostic evaluation may be required (advanced imaging)  High risk treatment options (thrombolytics and/or thrombectomy)    Patient care was coordinated with spoke provider, including but not limted to    Discussing likely diagnosis/etiology of symptoms  Making recommendations for further diagnostic studies  Making recommendations for intravenous thrombolytics or other advanced therapies  Making recommendations for disposition (admission/transfer for higher level of care)

## 2024-09-17 NOTE — HPI
Patient is a 33 y.o. aa female with a PMH of FERNANDO who presents to the Emergency Department for a potential stroke evaluation. Patient reports left sided weakness/numbness, speech difficulty, and facial droop. She also reported headache and left eye pain. States having unsteady gate. Denies ever having similar symptoms on the past. Denies any chest pain, sob however does report nausea.     In the ED, code stroke called and tpa was not recommended. Head CT and CTA negative.

## 2024-09-18 VITALS
DIASTOLIC BLOOD PRESSURE: 56 MMHG | OXYGEN SATURATION: 99 % | SYSTOLIC BLOOD PRESSURE: 106 MMHG | BODY MASS INDEX: 30.56 KG/M2 | TEMPERATURE: 98 F | WEIGHT: 183.44 LBS | RESPIRATION RATE: 16 BRPM | HEART RATE: 55 BPM | HEIGHT: 65 IN

## 2024-09-18 LAB
OHS QRS DURATION: 78 MS
OHS QTC CALCULATION: 460 MS

## 2024-09-18 PROCEDURE — 94761 N-INVAS EAR/PLS OXIMETRY MLT: CPT

## 2024-09-18 PROCEDURE — 96366 THER/PROPH/DIAG IV INF ADDON: CPT

## 2024-09-18 PROCEDURE — 97110 THERAPEUTIC EXERCISES: CPT

## 2024-09-18 PROCEDURE — 96365 THER/PROPH/DIAG IV INF INIT: CPT

## 2024-09-18 PROCEDURE — 63600175 PHARM REV CODE 636 W HCPCS: Performed by: NURSE PRACTITIONER

## 2024-09-18 PROCEDURE — G0426 INPT/ED TELECONSULT50: HCPCS | Mod: GT,,, | Performed by: STUDENT IN AN ORGANIZED HEALTH CARE EDUCATION/TRAINING PROGRAM

## 2024-09-18 PROCEDURE — 97116 GAIT TRAINING THERAPY: CPT

## 2024-09-18 PROCEDURE — G0378 HOSPITAL OBSERVATION PER HR: HCPCS

## 2024-09-18 RX ORDER — MAGNESIUM SULFATE HEPTAHYDRATE 40 MG/ML
2 INJECTION, SOLUTION INTRAVENOUS ONCE
Status: COMPLETED | OUTPATIENT
Start: 2024-09-18 | End: 2024-09-18

## 2024-09-18 RX ORDER — LANOLIN ALCOHOL/MO/W.PET/CERES
400 CREAM (GRAM) TOPICAL DAILY
Qty: 30 TABLET | Refills: 1 | Status: SHIPPED | OUTPATIENT
Start: 2024-09-18

## 2024-09-18 RX ORDER — RIBOFLAVIN (VITAMIN B2) 400 MG
400 TABLET ORAL DAILY
Qty: 30 TABLET | Refills: 1 | Status: SHIPPED | OUTPATIENT
Start: 2024-09-18

## 2024-09-18 RX ADMIN — MAGNESIUM SULFATE HEPTAHYDRATE 2 G: 40 INJECTION, SOLUTION INTRAVENOUS at 11:09

## 2024-09-18 NOTE — PT/OT/SLP PROGRESS
"Physical Therapy  Treatment    Lizzy Anders   MRN: [id number]   Admitting Diagnosis: Left-sided weakness    PT Received On: 09/17/24  PT Start Time: 1200     PT Stop Time: 1225    PT Total Time (min): 25 min       Billable Minutes:  Gait Training 13 and Therapeutic Exercise 12    Treatment Type: Treatment  PT/PTA: PT     Number of PTA visits since last PT visit: 0       General Precautions: Standard, fall  Orthopedic Precautions: N/A  Braces: N/A  Respiratory Status: Room air    Spiritual, Cultural Beliefs, Orthodox Practices, Values that Affect Care: no    Subjective:  Communicated with nursing (Mireya) and performed chart review via epic system prior to session.  Pt agreeable to PT services "I'm much better" pt reporting that the tightness in her hip persists but it has improved  "The doctors told me that they saw seizure activity on my test"    Pain/Comfort  Pain Rating 1: 0/10    Objective:   Patient found with: peripheral IV. Pt presents supine in bed    Functional Mobility:  Bed Mobility:    Facilitated supine <> sit with mod I   Seated scooting independently   Tolerated sitting EOB with no LOB    Negative clonus to RLE    Transfers:   Sit<>stand SBA with no AD   Stand pivot SBA with IV pole    Gait:    Facilitated gait activity 100 ft x 2 SBA/CGA with IV pole, pt required standing rest break due to tightness to L hip joint stating "It feels like it's trying to pull backward"    Balance:   Dynamic Sit: FAIR+: Maintains balance through MINIMAL excursions of active trunk motion  Dynamic stand: FAIR+: Needs CLOSE SUPERVISION during gait and is able to right self with minor LOB       Treatment and Education:  Educated pt on benefits of consistent participation in PT services to meet functional goals. Educated pt on supine/sidelying therex to promote strength, muscle length and joint mobility. Exercises included anterior and posterior pelvic tilts with knee bent in bed, hip abd with knees bent in bed, " sidelying hip extension with knee extended to gently stretch hip flexor and promote improved fluidity of movement with decreased pain. Educated pt on importance of performing exercises to tolerance and not pushing through pain at this early stage. Educated pt on call don't fall policy and use of call button to alert nursing staff of needs (including to assist in/out of bed). Pt expressed understanding.      AM-PAC 6 CLICK MOBILITY  How much help from another person does this patient currently need?   1 = Unable, Total/Dependent Assistance  2 = A lot, Maximum/Moderate Assistance  3 = A little, Minimum/Contact Guard/Supervision  4 = None, Modified [place]/Independent    Turning over in bed (including adjusting bedclothes, sheets and blankets)?: 3  Sitting down on and standing up from a chair with arms (e.g., wheelchair, bedside commode, etc.): 3  Moving from lying on back to sitting on the side of the bed?: 3  Moving to and from a bed to a chair (including a wheelchair)?: 3  Need to walk in hospital room?: 3  Climbing 3-5 steps with a railing?: 1  Basic Mobility Total Score: 16    AM-PAC Raw Score CMS G-Code Modifier Level of Impairment Assistance   6 % Total / Unable   7 - 9 CM 80 - 100% Maximal Assist   10 - 14 CL 60 - 80% Moderate Assist   15 - 19 CK 40 - 60% Moderate Assist   20 - 22 CJ 20 - 40% Minimal Assist   23 CI 1-20% SBA / CGA   24 CH 0% Independent/ Mod I     Patient left supine with all lines intact, call button in reach, nursing notified, and spouse present.    Assessment:  Lizzy Anders is a 33 y.o. female with a medical diagnosis of Left-sided weakness and presents with deficits in overall mobility and increased pain with mobility. Pt reported that she experienced increased tension to L side and twitching/shaking as well as L facial droop at onset of original incident. Pt will benefit from continued PT services at low intensity to address muscle tightness, weakness and subsequent  gait instability in order to return to PLOF. Pt may benefit from RW for longer distances. The mobility limitation cannot be sufficiently resolved by the use of a cane.   Patient's functional mobility deficit can be sufficiently resolved with the use of a rolling walker. Patient's mobility limitation significantly impairs their ability to participate in one of more activities of daily living. The use of a rolling walker will significantly improve the patient's ability to participate in MRADLS and the patient will use it on regular basis in the home.     This patient has a mobility limitation that   Prevents them entirely  from accomplishing MRADL's in customary locations in the home   Places them at reasonable determined heightened risk of mobility or mortality secondary to attempts to perform an MRADL   Prevents them from completing MRADL's in a reasonable time frame.    Rehab identified problem list/impairments: weakness, impaired endurance, impaired functional mobility, gait instability, impaired balance, decreased lower extremity function, decreased safety awareness, pain    Rehab potential is good.    Activity tolerance: Good    Discharge recommendations: Low Intensity Therapy      Barriers to discharge:      Equipment recommendations: walker, rolling     GOALS:   Multidisciplinary Problems       Physical Therapy Goals          Problem: Physical Therapy    Goal Priority Disciplines Outcome Goal Variances Interventions   Physical Therapy Goal     PT, PT/OT Progressing     Description: Pt will perform bed mobility independently in order to participate in EOB activity.  Pt will perform transfers independently in order to participate in OOB activity.  Pt will ambulate 250 ft mod I with LRAD in order to participate in daily tasks.   Pt will tolerate sitting OOB x 2-4 hrs in order to participate in daily tasks.                        PLAN:    Patient to be seen 3 x/week to address the above listed problems via gait  training, therapeutic activities, therapeutic exercises, neuromuscular re-education  Plan of Care expires: 10/01/24  Plan of Care reviewed with: patient         09/18/2024

## 2024-09-18 NOTE — TELEMEDICINE CONSULT
"[facility]   General Neurology  Consult Note      Consult Information  Inpatient consult to Telemedicine-General Neurology  Consult performed by: Julia Arnett MD  Consult ordered by: Santa Adame NP          Consulting Provider: NAVDEEP JOHNSON   Current Providers  No providers found    Patient Location:  [facility] TELEMETRY IP Unit    Spoke hospital nurse at bedside with patient assisting consultant.  Patient information was obtained from patient.       Neurology Documentation  Acute Stroke Times   Last Known Normal Date: 09/16/24  Last Known Normal Time:  (Prior to going to bed)  Unknown Symptom Onset Date: Unknown Date  Unknown Symptom Onset Time: Unknown Time  Stroke Team Called Date: 09/18/24  Stroke Team Called Time: 0915  Stroke Team Arrival Date: 09/18/24  Stroke Team Arrival Time: 1030  CT Interpretation Time: 0802  Thrombolytic Therapy Recommended: No    Blood pressure 101/61, pulse (!) 53, temperature 97.9 °F (36.6 °C), temperature source Oral, resp. rate 16, height 5' 5" (1.651 m), weight 83.2 kg (183 lb 6.8 oz), last menstrual period 08/10/2024, SpO2 98%, not currently breastfeeding.    Medical Decision Making  HPI:    33 y.o. female with medical history of chronic migraines / anxiety with admission concerns for focal neurological deficits. And neurology consulted for the same.    History from patient / medical records review. Wake up symptoms of left sided numbness, weakness, with facial droop and pain - with gradual improvement over the time. Stroke work up unrevealing for focal ischemic etiology. Associated symptoms include right sided headaches / with some migrainous characteristics. No history of strokes / seizures. Pertinent history - chronic migraines / 3-4 per week / with lexapro management     Exam: awake, alert, following commands, no focal motor or sensory or cranial nerve deficits. No active c/o headaches.     Images personally reviewed and interpreted:  Study: Head CT, CTA " "Head & Neck, and MRI Brain  Study Interpretation: CT head negative for acute findings / no evidence of early or subacute ischemic changes, intracerebral hemorrhage or hyperdense vessel signs  CTA negative for any LVO or MeVO in the pertinent vascular territory of interest - no targets identified for acute or urgent reperfusion therapy. Negative for correlating intracranial or extracranial HO pathology.      Additional studies reviewed:   Study: Cardiac_Neuro: EEG  Study Interpretation: N/A    Laboratory studies reviewed:  BMP:   Lab Results   Component Value Date     09/17/2024    K 3.9 09/17/2024     09/17/2024    CO2 23 09/17/2024    BUN 13 09/17/2024    CREATININE 0.8 09/17/2024    CALCIUM 9.2 09/17/2024     CBC:   Lab Results   Component Value Date    WBC 3.29 (L) 09/17/2024    RBC 4.90 09/17/2024    HGB 13.9 09/17/2024    HCT 42.4 09/17/2024     09/17/2024    MCV 87 09/17/2024    MCH 28.4 09/17/2024    MCHC 32.8 09/17/2024     Lipid Panel:   Lab Results   Component Value Date    CHOL 120 09/17/2024    LDLCALC 72.0 09/17/2024    HDL 38 (L) 09/17/2024    TRIG 50 09/17/2024     Coagulation:   Lab Results   Component Value Date    INR 1.0 09/17/2024     Hgb A1C: No results found for: "HGBA1C"  TSH:   Lab Results   Component Value Date    TSH 0.931 09/17/2024       Documentation personally reviewed:  Notes: Notes: H&P     Assessment and plan:  Recs:  Symptoms of transient focal neurological deficits - suspect complex headache / migranous pattern rather other primary headaches or secondary etiology. Low suspicion for focal cerebrovascular ischemic event or epileptic or neuro inflammatory etiology.      Headache management - IP abortive therapy / IF needed   - IV MgSo4 2g q12 hrs PRN 1-2 days +/-   - IV Valproic acid 1g q24 hr PRN 1-2 days +/-   - Companzine prn for nausea / vomiting     - Pharmacological options:   OP Abortive therapy: tylenol prn / ibuporfen prn      --- Preventive therapy: " Discharge on Magnesium oxide 400 mg daily  / riboflavin 400 mg daily combination at [place]  --- OP neurology for further regulation on abortive and preventive options     -  on importance of maintainance of head diary / identification of triggers, control of triggers   - Lifestyle modifications -   on healthy sleep hygiene / dietary patterns / avoidance of stressors / compliance with medications      Post charge discharge plan:  Clinic follow up: Headache    Visit Type: in-person only  Timeframe: 4 weeks        Additional Physical Exam, History, & ROS    History reviewed. No pertinent past medical history.  History reviewed. No pertinent surgical history.  No family history on file.    Diagnoses  Problem Noted   Headache 9/17/2024       Julia Arnett MD    Neurology consultation requested by spoke provider. Audiovisual encounter with the patient performed using a secure connection.  Results and impressions from the visit are documented on this note and were communicated to the consulting provider/team via direct communication. The note has been shared for addition to the patients electronic medical record.

## 2024-09-18 NOTE — NURSING
Discharge instructions received and reviewed with pt and  at bedside.  Pt voiced understanding and all questions answered to satisfaction.  Stressed importance to making and keeping all follow up appointments.  Medications brought to the bedside by the pharmacy.  Tele monitor removed and brought to monitor tech.  IV d/c'd with tip intact, pressure dressing applied.  Pt transported to front of hospital via w/c by PCT to be discharged home.

## 2024-09-18 NOTE — SUBJECTIVE & OBJECTIVE
HPI:    33 y.o. female with medical history of chronic migraines / anxiety with admission concerns for focal neurological deficits. And neurology consulted for the same.    History from patient / medical records review. Wake up symptoms of left sided numbness, weakness, with facial droop and pain - with gradual improvement over the time. Stroke work up unrevealing for focal ischemic etiology. Associated symptoms include right sided headaches / with some migrainous characteristics. No history of strokes / seizures. Pertinent history - chronic migraines / 3-4 per week / with lexapro management     Exam: awake, alert, following commands, no focal motor or sensory or cranial nerve deficits. No active c/o headaches.     Images personally reviewed and interpreted:  Study: Head CT, CTA Head & Neck, and MRI Brain  Study Interpretation: CT head negative for acute findings / no evidence of early or subacute ischemic changes, intracerebral hemorrhage or hyperdense vessel signs  CTA negative for any LVO or MeVO in the pertinent vascular territory of interest - no targets identified for acute or urgent reperfusion therapy. Negative for correlating intracranial or extracranial HO pathology.      Additional studies reviewed:   Study: Cardiac_Neuro: EEG  Study Interpretation: N/A    Laboratory studies reviewed:  BMP:   Lab Results   Component Value Date     09/17/2024    K 3.9 09/17/2024     09/17/2024    CO2 23 09/17/2024    BUN 13 09/17/2024    CREATININE 0.8 09/17/2024    CALCIUM 9.2 09/17/2024     CBC:   Lab Results   Component Value Date    WBC 3.29 (L) 09/17/2024    RBC 4.90 09/17/2024    HGB 13.9 09/17/2024    HCT 42.4 09/17/2024     09/17/2024    MCV 87 09/17/2024    MCH 28.4 09/17/2024    MCHC 32.8 09/17/2024     Lipid Panel:   Lab Results   Component Value Date    CHOL 120 09/17/2024    LDLCALC 72.0 09/17/2024    HDL 38 (L) 09/17/2024    TRIG 50 09/17/2024     Coagulation:   Lab Results   Component  "Value Date    INR 1.0 09/17/2024     Hgb A1C: No results found for: "HGBA1C"  TSH:   Lab Results   Component Value Date    TSH 0.931 09/17/2024       Documentation personally reviewed:  Notes: Notes: H&P     Assessment and plan:  Recs:  Symptoms of transient focal neurological deficits - suspect complex headache / migranous pattern rather other primary headaches or secondary etiology. Low suspicion for focal cerebrovascular ischemic event or epileptic or neuro inflammatory etiology.      Headache management - IP abortive therapy / IF needed   - IV MgSo4 2g q12 hrs PRN 1-2 days +/-   - IV Valproic acid 1g q24 hr PRN 1-2 days +/-   - Companzine prn for nausea / vomiting     - Pharmacological options:   OP Abortive therapy: tylenol prn / ibuporfen prn      --- Preventive therapy: Discharge on Magnesium oxide 400 mg daily  / riboflavin 400 mg daily combination at [place]  --- OP neurology for further regulation on abortive and preventive options     -  on importance of maintainance of head diary / identification of triggers, control of triggers   - Lifestyle modifications -   on healthy sleep hygiene / dietary patterns / avoidance of stressors / compliance with medications      Post charge discharge plan:  Clinic follow up: Headache    Visit Type: in-person only  Timeframe: 4 weeks      "

## 2024-09-18 NOTE — HPI
33 y.o. female with medical history of chronic migraines / anxiety with admission concerns for focal neurological deficits. And neurology consulted for the same.    History from patient / medical records review. Wake up symptoms of left sided numbness, weakness, with facial droop and pain - with gradual improvement over the time. Stroke work up unrevealing for focal ischemic etiology. Associated symptoms include right sided headaches / with some migrainous characteristics. No history of strokes / seizures. Pertinent history - chronic migraines / 3-4 per week / with lexapro management     Exam: awake, alert, following commands, no focal motor or sensory or cranial nerve deficits. No active c/o headaches.

## 2024-09-19 PROBLEM — G43.511 INTRACTABLE PERSISTENT MIGRAINE AURA WITHOUT CEREBRAL INFARCTION AND WITH STATUS MIGRAINOSUS: Status: ACTIVE | Noted: 2024-09-19

## 2024-09-19 NOTE — DISCHARGE SUMMARY
[facility] - Telemetry ([facility])  [facility] Medicine  Discharge Summary      Patient Name: Lizzy Anders  MRN: [id number]  [facility] ([facility]): [id number]  Patient Class: OP- Observation  Admission Date: 9/17/2024  Hospital Length of Stay: 0 days  Discharge Date and Time: 9/18/2024  5:19 PM  Attending Physician: Dr. Severino  Discharging Provider: Santa Adame NP  Primary Care Provider: Leida Primary Doctor    Primary Care Team: [facility] MEDICINE A    HPI:   Patient is a 33 y.o. aa female with a PMH of FERNANDO who presents to the Emergency Department for a potential stroke evaluation. Patient reports left sided weakness/numbness, speech difficulty, and facial droop. She also reported headache and left eye pain. States having unsteady gate. Denies ever having similar symptoms on the past. Denies any chest pain, sob however does report nausea.     In the ED, code stroke called and tpa was not recommended. Head CT and CTA negative.          * No surgery found *      Hospital Course:   The patient is a 34yo female with hx FERNANDO and Migraines who was placed in observation with headache behind left eye with left sided weakness/numbness. CT head showed nothing acute. CTA head/neck was unremarkable. MRI brain showed no acute CVA, a foci of T2/FLAIR hyperintense signal in the left frontal subcortical white matter, nonspecific but likely sequela of migraine. Tele-neuro consulted suspected complex headache/migranous pattern  He recommended:    Headache management - IP abortive therapy / IF needed   - IV MgSo4 2g q12 hrs PRN 1-2 days +/-   - IV Valproic acid 1g q24 hr PRN 1-2 days +/-   - Companzine prn for n/v     - Pharmacological options:   OP Abortive therapy: tylenol prn / ibuporfen prn       -Preventive therapy: Discharge on Magnesium oxide 400 mg daily  / riboflavin 400 mg daily combination at Dc  -OP neurology for further regulation on abortive and preventive options   -  on importance of maintainance of head diary /  identification of triggers, control of triggers   - Lifestyle modifications -   on healthy sleep hygiene / dietary patterns / avoidance of stressors / compliance with medications  recommended     This was all discussed with the pt. She was prescribed magnesium Oxide and Riboflavin. She was provided with the ph number to [facility] headache center to make appt. The patient was seen and examined today and determined to be stable for discharge.         Goals of Care Treatment Preferences:  Code Status: Full Code         Consults:   Consults (From admission, onward)          Status Ordering Provider     Inpatient consult to Telemedicine-General Neurology  Once        Provider:  Julia Arnett MD    Completed FAMILIA CROOK     Consult to Telemedicine - Acute Stroke  Once        Provider:  Mylene Peres MD    Completed NAVDEEP JOHNSON            Final Active Diagnoses:    Diagnosis Date Noted POA    PRINCIPAL PROBLEM:  Intractable persistent migraine aura without cerebral infarction and with status migrainosus [G43.511] 09/19/2024 Yes    Left-sided weakness [R53.1] 09/17/2024 Yes    Headache [R51.9] 09/17/2024 Yes    FERNANDO (generalized anxiety disorder) [F41.1] 09/17/2024 Yes    Insomnia [G47.00] 09/17/2024 Yes      Problems Resolved During this Admission:       Discharged Condition: stable    Disposition: Home or Self Care    Follow Up:   Follow-up Information       No, Primary Doctor Follow up in 3 day(s).               [facility] Follow up in 2 week(s).    Specialty: Neurology  Contact information:  [address] [facility] [address]  [phone]             [facility] [place] Follow up.    Why: REFERRAL FAXED TO CLINIC    PLEASE CALL TO FOLLOW UP ON REFERRAL IF YOU ARE NOT CONTACTED WITHIN A WEEK OF DISCHARGE.  Contact information:  [address] [place] [address] [phone]               [facility] [place]  Follow up.    Why: tel:[phone]                         Patient Instructions:      Ambulatory referral/consult to Neurology   Standing Status: Future   Referral Priority: Routine Referral Type: Consultation   Referral Reason: Specialty Services Required   Requested Specialty: Neurology   Number of Visits Requested: 1     Ambulatory referral/consult to Physical/Occupational Therapy   Standing Status: Future   Referral Priority: Routine Referral Type: Physical Medicine   Referral Reason: Specialty Services Required   Requested Specialty: Physical Therapy   Number of Visits Requested: 1     Ambulatory referral/consult to Physical/Occupational Therapy   Standing Status: Future   Referral Priority: Routine Referral Type: Physical Medicine   Referral Reason: Specialty Services Required   Requested Specialty: Occupational Therapy   Number of Visits Requested: 1     Diet Adult Regular     Activity as tolerated       Significant Diagnostic Studies:   Imaging Results              MRI Brain Without Contrast (Final result)  Result time 09/17/24 12:57:55      Final result by Steve De La Paz MD (09/17/24 12:57:55)                   Impression:      No acute intracranial abnormality.    Small foci of T2/FLAIR hyperintense signal in the left frontal subcortical white matter, nonspecific but likely sequela of migraine or other nonspecified vasculopathy rather than chronic microvascular ischemia given relative young age of the patient.      Electronically signed by: Steve De La Paz  Date:    09/17/2024  Time:    12:57               Narrative:    EXAMINATION:  MRI BRAIN WITHOUT CONTRAST    CLINICAL HISTORY:  Stroke, follow up;    TECHNIQUE:  Multiplanar multisequence MR imaging of the brain was performed without contrast.    COMPARISON:  CT and CTA performed earlier on 09/17/2024    FINDINGS:  Cerebral and ventricular volumes are within normal limits.  No evidence of hydrocephalus.    Small foci of T2/FLAIR hyperintense  signal noted in the left frontal subcortical white matter.    No evidence of acute territorial infarct, intracranial hemorrhage, or mass lesion.  No abnormal restricted diffusion or susceptibility artifact.  No significant midline shift or mass effect.    Midline structures and posterior fossa structures are unremarkable.  Basal cisterns are clear.  Major vascular flow voids are unremarkable.    Cranium is unremarkable.  Orbits and globes are within normal limits.  Paranasal sinuses and mastoid air cells are clear.                                       CTA Head and Neck (xpd) (Final result)  Result time 09/17/24 09:47:56      Final result by Gerry Barnes MD (09/17/24 09:47:56)                   Impression:      No acute abnormality. No high-grade stenosis or major vessel occlusion.    Findings discussed with the emergency room physician at the time of dictation.    All CT scans at this facility are performed  using dose modulation techniques as appropriate to performed exam including the following:  automated exposure control; adjustment of mA and/or kV according to the patients size (this includes techniques or standardized protocols for targeted exams where dose is matched to indication/reason for exam: i.e. extremities or head);  iterative reconstruction technique.      Electronically signed by: Gerry Barnes  Date:    09/17/2024  Time:    09:47               Narrative:    EXAMINATION:  CTA HEAD AND NECK (XPD)    CLINICAL HISTORY:  Stroke/TIA, determine embolic source;    TECHNIQUE:  CT angiogram was performed from the level of the rex to the top of the head following the IV administration of 100mL of Omnipaque 350.   Sagittal and coronal reconstructions and maximum intensity projection reconstructions were performed. Arterial stenosis percentages are based on NASCET measurement criteria.    COMPARISON:  CT head same date    FINDINGS:  Non-Vascular Structures of the Neck/Thoracic Inlet (limited  evaluation): Normal.    Aorta: Normal 3 vessel arch.    Extracranial carotid circulation: Common carotid arteries appear intact throughout their course.  Carotid bifurcations appear unremarkable.  No significant stenosis or atherosclerosis.  No aneurysm or dissection.  More cranial ascending internal carotid arteries appear intact the skull base.    Extracranial vertebral circulation: No hemodynamically significant stenosis, aneurysmal dilatation, or dissection.  Vertebral arteries are co dominant in the extracranial portion.    Intracranial Arteries:    ICA terminus are intact bilaterally.  Right MCA appears intact.  No aneurysm, severe stenosis, or occlusion.  Left MCA appears intact.  No aneurysm, severe stenosis, or occlusion.  Bilateral JOSE appear intact.  No aneurysm, severe stenosis, or occlusion.    Intracranial vertebrobasilar circulation appears unremarkable.  Left PCA appears intact.  No aneurysm, severe stenosis or occlusion.  Right PCA arises through fetal circulation and appears intact.  No aneurysm, severe stenosis    Venous structures (limited evaluation): Normal.                                       CT Head Without Contrast (Final result)  Result time 09/17/24 08:00:05      Final result by Steve De La Paz MD (09/17/24 08:00:05)                   Impression:      No acute intracranial abnormality.  ASPECT score of 10.    Findings relayed to Dr. May via epic secure chat at 07:59 on 09/17/2024.      Electronically signed by: Steve De La Paz  Date:    09/17/2024  Time:    08:00               Narrative:    EXAMINATION:  CT HEAD WITHOUT CONTRAST    CLINICAL HISTORY:  Neuro deficit, acute, stroke suspected;    TECHNIQUE:  Low dose axial images were obtained through the head.  Coronal and sagittal reformations were also performed. Contrast was not administered.    COMPARISON:  None.    FINDINGS:  Cerebral and ventricular volumes are within normal limits.  No evidence of hydrocephalus.    No  evidence of acute territorial infarct, intracranial hemorrhage, or mass lesion.  No significant midline shift or mass effect.    Midline structures and posterior fossa structures appear unremarkable.  Basal cisterns are clear.  No significant arterial calcifications seen.    Calvarium is intact without acute or aggressive abnormality.  Visualized orbits and globes are within normal limits.  Visualized paranasal sinuses and mastoid air cells are clear.                                       Pending Diagnostic Studies:       None           Medications:  Reconciled Home Medications:      Medication List        START taking these medications      magnesium oxide 400 mg (241.3 mg magnesium) tablet  Commonly known as: MAG-OX  Take 1 tablet (400 mg total) by mouth once daily.     riboflavin (vitamin B2) 400 mg Tab  Take 400 mg by mouth once daily.            CONTINUE taking these medications      benzoyl peroxide 10 % external wash  Commonly known as: BENZAC AC  Apply topically.     clindamycin phosphate 1% 1 % gel  Commonly known as: CLINDAGEL  Apply three times daily for seven to ten days for folliculitis.     gabapentin 100 MG capsule  Commonly known as: NEURONTIN  Take 1 capsule by mouth every morning.     hydrOXYzine HCL 25 MG tablet  Commonly known as: ATARAX  Take 50 mg by mouth.     ibuprofen 800 MG tablet  Commonly known as: ADVIL,MOTRIN  Take 800 mg by mouth.     levonorgestreL 52 mg IUD  Commonly known as: Mirena  52 mg by Intrauterine route.     traZODone 50 MG tablet  Commonly known as: DESYREL  Take 50 mg by mouth every evening.            STOP taking these medications      medroxyPROGESTERone 10 MG tablet  Commonly known as: PROVERA              Indwelling Lines/Drains at time of discharge:   Lines/Drains/Airways       None                   Time spent on the discharge of patient: 45 minutes         Santa Adame NP  Department of Hospital Medicine  [facility] - Telemetry ([facility])

## 2024-09-19 NOTE — HOSPITAL COURSE
The patient is a 34yo female with hx FERNANDO and Migraines who was placed in observation with headache behind left eye with left sided weakness/numbness. CT head showed nothing acute. CTA head/neck was unremarkable. MRI brain showed no acute CVA, a foci of T2/FLAIR hyperintense signal in the left frontal subcortical white matter, nonspecific but likely sequela of migraine. Tele-neuro consulted suspected complex headache/migranous pattern  He recommended:    Headache management - IP abortive therapy / IF needed   - IV MgSo4 2g q12 hrs PRN 1-2 days +/-   - IV Valproic acid 1g q24 hr PRN 1-2 days +/-   - Companzine prn for n/v     - Pharmacological options:   OP Abortive therapy: tylenol prn / ibuporfen prn       -Preventive therapy: Discharge on Magnesium oxide 400 mg daily  / riboflavin 400 mg daily combination at Dc  -OP neurology for further regulation on abortive and preventive options   -  on importance of maintainance of head diary / identification of triggers, control of triggers   - Lifestyle modifications -   on healthy sleep hygiene / dietary patterns / avoidance of stressors / compliance with medications  recommended     This was all discussed with the pt. She was prescribed magnesium Oxide and Riboflavin. She was provided with the ph number to [facility] headache center to make appt. The patient was seen and examined today and determined to be stable for discharge.

## 2024-09-20 ENCOUNTER — HOSPITAL ENCOUNTER (OUTPATIENT)
Dept: RADIOLOGY | Facility: HOSPITAL | Age: 34
Discharge: HOME | End: 2024-09-20
Attending: PSYCHIATRY & NEUROLOGY
Payer: OTHER GOVERNMENT

## 2024-09-20 ENCOUNTER — OFFICE VISIT (OUTPATIENT)
Dept: NEUROLOGY | Facility: CLINIC | Age: 34
End: 2024-09-20
Payer: OTHER GOVERNMENT

## 2024-09-20 VITALS
DIASTOLIC BLOOD PRESSURE: 71 MMHG | HEIGHT: 65 IN | HEART RATE: 71 BPM | BODY MASS INDEX: 30.49 KG/M2 | SYSTOLIC BLOOD PRESSURE: 101 MMHG | RESPIRATION RATE: 16 BRPM | WEIGHT: 183 LBS

## 2024-09-20 DIAGNOSIS — M25.552 LEFT HIP PAIN: ICD-10-CM

## 2024-09-20 DIAGNOSIS — G43.E09 CHRONIC MIGRAINE WITH AURA WITHOUT STATUS MIGRAINOSUS, NOT INTRACTABLE: Primary | ICD-10-CM

## 2024-09-20 PROCEDURE — 73502 X-RAY EXAM HIP UNI 2-3 VIEWS: CPT | Mod: TC,LT

## 2024-09-20 PROCEDURE — 99999 PR PBB SHADOW E&M-EST. PATIENT-LVL V: CPT | Mod: PBBFAC,,, | Performed by: PSYCHIATRY & NEUROLOGY

## 2024-09-20 PROCEDURE — 99215 OFFICE O/P EST HI 40 MIN: CPT | Mod: PBBFAC,25 | Performed by: PSYCHIATRY & NEUROLOGY

## 2024-09-20 PROCEDURE — 73502 X-RAY EXAM HIP UNI 2-3 VIEWS: CPT | Mod: 26,LT,, | Performed by: RADIOLOGY

## 2024-09-20 RX ORDER — NORTRIPTYLINE HYDROCHLORIDE 10 MG/1
1 CAPSULE ORAL NIGHTLY
COMMUNITY
End: 2024-09-20 | Stop reason: DRUGHIGH

## 2024-09-20 RX ORDER — NORTRIPTYLINE HYDROCHLORIDE 25 MG/1
25 CAPSULE ORAL NIGHTLY
Qty: 30 CAPSULE | Refills: 3 | Status: SHIPPED | OUTPATIENT
Start: 2024-09-20 | End: 2025-01-18

## 2024-09-20 NOTE — PROGRESS NOTES
"Subjective:      Patient ID: Lizzy Anders is a 33 y.o. female.    Chief Complaint:  " Acute Neurological deficit ".       HPI 33 Years old Female with PMHx of Headaches / FERNANDO and others Medical issues   came for the evaluation and recommendation of  " Acute Neurological deficit ".      Started: 5 days ago.   Describes: " face dropping ".   Timing: about 2 hours.   Frequency: one time.   Pain:  left Hip pain - 5/ 10.   Location: CNS / Left Hip.   Family: Mother and Dad with CVA / Mother with Migraines.   Medications: Trazodone / Elavil / Neurontin / Atarax /   Worsen: none.   Alleviated: none.   Associated symptoms:  left side weakness / numbness / tingling.    Triggers: none.   Visual Auras: positive.   Prodrome symptoms:          Referral by ER.        Left leg - still " numbness ".        Hx of Chronic Migraine's headaches.         She woke up with the Neuro symptoms / vomiting - as far as She knows no headaches.     Review of Systems   Neurological:  Positive for headaches.        Transient Neurological symptoms.    All other systems reviewed and are negative.    Objective:     Neurologic Exam     Mental Status   Oriented to person, place, and time.   Registration: recalls 3 of 3 objects. Recall at 5 minutes: recalls 3 of 3 objects. Follows 3 step commands.   Attention: normal. Concentration: normal.   Speech: speech is normal   Level of consciousness: alert  Knowledge: good. Able to perform simple calculations.   Able to name object. Able to read. Able to repeat. Able to write. Normal comprehension.     Cranial Nerves   Cranial nerves II through XII intact.     CN III, IV, VI   Pupils are equal, round, and reactive to light.    Motor Exam   Muscle bulk: normal  Overall muscle tone: normal    Strength   Strength 5/5 throughout.   Right neck flexion: 5/5  Left neck flexion: 5/5  Right neck extension: 5/5  Left neck extension: 5/5  Right deltoid: 5/5  Left deltoid: 5/5  Right biceps: 5/5  Left biceps: " 5/5  Right triceps: 5/5  Left triceps: 5/5  Right wrist flexion: 5/5  Left wrist flexion: 5/5  Right wrist extension: 5/5  Left wrist extension: 5/5  Right interossei: 5/5  Left interossei: 5/5  Right abdominals: 5/5  Left abdominals: 5/5  Right iliopsoas: 5/5  Left iliopsoas: 5/5  Right quadriceps: 5/5  Left quadriceps: 5/5  Right hamstrin/5  Left hamstrin/5  Right glutei: 5/5  Left glutei: 5/5  Right anterior tibial: 5/5  Left anterior tibial: 5/5  Right posterior tibial: 5/5  Left posterior tibial: 5/5  Right peroneal: 5/5  Left peroneal: 5/5  Right gastroc: 5/5  Left gastroc: 5/5    Sensory Exam   Light touch normal.   Vibration normal.   Proprioception normal.   Pinprick normal.   Graphesthesia: normal  Stereognosis: normal    Gait, Coordination, and Reflexes     Gait  Gait: (Antalgic.)    Coordination   Romberg: negative  Finger to nose coordination: normal  Heel to shin coordination: normal  Tandem walking coordination: normal    Tremor   Resting tremor: absent  Intention tremor: absent  Action tremor: absent    Reflexes   Right brachioradialis: 2+  Left brachioradialis: 2+  Right biceps: 2+  Left biceps: 2+  Right triceps: 2+  Left triceps: 2+  Right patellar: 2+  Left patellar: 2+  Right achilles: 2+  Left achilles: 2+  Right : 2+  Left : 2+  Right plantar: normal  Left plantar: normal  Right Hull: absent  Left Hull: absent  Right ankle clonus: absent  Left ankle clonus: absent  Right pendular knee jerk: absent  Left pendular knee jerk: absentLeft Hip pain to PROM.        Physical Exam  Vitals and nursing note reviewed.   Constitutional:       Appearance: Normal appearance. She is normal weight.   HENT:      Head: Normocephalic and atraumatic.      Right Ear: Tympanic membrane normal.      Left Ear: Tympanic membrane normal.      Nose: Nose normal.      Mouth/Throat:      Mouth: Mucous membranes are moist.      Pharynx: Oropharynx is clear.   Eyes:      Extraocular Movements:  "Extraocular movements intact.      Conjunctiva/sclera: Conjunctivae normal.      Pupils: Pupils are equal, round, and reactive to light.   Cardiovascular:      Rate and Rhythm: Normal rate and regular rhythm.      Pulses: Normal pulses.      Heart sounds: Normal heart sounds.   Pulmonary:      Effort: Pulmonary effort is normal.      Breath sounds: Normal breath sounds.   Abdominal:      General: Bowel sounds are normal.      Palpations: Abdomen is soft.   Genitourinary:     Comments: Deferred.   Musculoskeletal:      Cervical back: Normal range of motion and neck supple.      Comments: Left Hip pain to PROM.    Skin:     General: Skin is warm and dry.      Capillary Refill: Capillary refill takes less than 2 seconds.   Neurological:      Mental Status: She is oriented to person, place, and time. Mental status is at baseline.      Cranial Nerves: Cranial nerves 2-12 are intact.      Motor: Motor strength is normal.     Coordination: Finger-Nose-Finger Test, Heel to Shin Test and Romberg Test normal.      Gait: Tandem walk normal.      Deep Tendon Reflexes:      Reflex Scores:       Tricep reflexes are 2+ on the right side and 2+ on the left side.       Bicep reflexes are 2+ on the right side and 2+ on the left side.       Brachioradialis reflexes are 2+ on the right side and 2+ on the left side.       Patellar reflexes are 2+ on the right side and 2+ on the left side.       Achilles reflexes are 2+ on the right side and 2+ on the left side.  Psychiatric:         Mood and Affect: Mood normal.         Speech: Speech normal.         Behavior: Behavior normal.         Thought Content: Thought content normal.         Judgment: Judgment normal.        Assessment:   33 Years old Female with PMHX as above came of the evaluation of  " Acute Neurological deficit ".   - Hx of Chronic Migraines Headaches W/ Auras.  - Migraine Auras W/ o Headache.   - Left Hip pain.   Plan:   Patient Neurological Assessment is non focal / has " some new left hip pain / seems to me the episode was a Migraine Aura W/ o headache.   She woke up vomiting and noticed the left side symptoms - my believe She woke up with a Migraine.   When She has Migraines has Visual Auras - spotty lights ( right visual field ) / few minutes before the HA's start ( phono/ photophobia / N and V )  Takes Excedrin Migraines PRN.  Migraines frequency at least 3 to 4 per week.   Increase Pamelor 10 mg to 25 mg Po Q Hs - for Migraine prevention.   X Ray left Hip.    Labs: CBC / CMP / Lipids panel / TSH / Drugs screen - 2024 - non significant abnormalities.     Personally reviewed CT Scan Head / MRI Brain - 2024 - no acute changes / T2 subcortical non specific changes.      Personally reviewed CTA Head and Neck - 2024 - no stenosis.     I spent a total of 45 minutes on the day of the visit.This includes face to face time and non-face to face time preparing to see the patient (eg, review of tests),   obtaining and/or reviewing separately obtained history, documenting clinical information in the electronic or other health record,   independently interpreting results and communicating results to the patient/family/caregiver, or care coordinator.    Please do not hesitate to contact me with any updates, questions or concerns.    Inocencio Georges MD.  General Neurologist.